# Patient Record
Sex: FEMALE | Race: WHITE | NOT HISPANIC OR LATINO | URBAN - METROPOLITAN AREA
[De-identification: names, ages, dates, MRNs, and addresses within clinical notes are randomized per-mention and may not be internally consistent; named-entity substitution may affect disease eponyms.]

---

## 2023-11-21 ENCOUNTER — INPATIENT (INPATIENT)
Facility: HOSPITAL | Age: 83
LOS: 7 days | Discharge: NURSING, REHAB OR LONG-TERM CARE | DRG: 377 | End: 2023-11-29
Attending: STUDENT IN AN ORGANIZED HEALTH CARE EDUCATION/TRAINING PROGRAM | Admitting: HOSPITALIST
Payer: MEDICARE

## 2023-11-21 VITALS
TEMPERATURE: 98 F | OXYGEN SATURATION: 99 % | RESPIRATION RATE: 18 BRPM | SYSTOLIC BLOOD PRESSURE: 122 MMHG | HEART RATE: 95 BPM | DIASTOLIC BLOOD PRESSURE: 53 MMHG

## 2023-11-21 LAB
BASOPHILS # BLD AUTO: 0.05 K/UL — SIGNIFICANT CHANGE UP (ref 0–0.2)
BASOPHILS # BLD AUTO: 0.05 K/UL — SIGNIFICANT CHANGE UP (ref 0–0.2)
BASOPHILS NFR BLD AUTO: 0.7 % — SIGNIFICANT CHANGE UP (ref 0–2)
BASOPHILS NFR BLD AUTO: 0.7 % — SIGNIFICANT CHANGE UP (ref 0–2)
EOSINOPHIL # BLD AUTO: 0.11 K/UL — SIGNIFICANT CHANGE UP (ref 0–0.5)
EOSINOPHIL # BLD AUTO: 0.11 K/UL — SIGNIFICANT CHANGE UP (ref 0–0.5)
EOSINOPHIL NFR BLD AUTO: 1.5 % — SIGNIFICANT CHANGE UP (ref 0–6)
EOSINOPHIL NFR BLD AUTO: 1.5 % — SIGNIFICANT CHANGE UP (ref 0–6)
HCT VFR BLD CALC: 23.1 % — LOW (ref 34.5–45)
HCT VFR BLD CALC: 23.1 % — LOW (ref 34.5–45)
HGB BLD-MCNC: 7.7 G/DL — LOW (ref 11.5–15.5)
HGB BLD-MCNC: 7.7 G/DL — LOW (ref 11.5–15.5)
IMM GRANULOCYTES NFR BLD AUTO: 0.4 % — SIGNIFICANT CHANGE UP (ref 0–0.9)
IMM GRANULOCYTES NFR BLD AUTO: 0.4 % — SIGNIFICANT CHANGE UP (ref 0–0.9)
LYMPHOCYTES # BLD AUTO: 1.13 K/UL — SIGNIFICANT CHANGE UP (ref 1–3.3)
LYMPHOCYTES # BLD AUTO: 1.13 K/UL — SIGNIFICANT CHANGE UP (ref 1–3.3)
LYMPHOCYTES # BLD AUTO: 15.6 % — SIGNIFICANT CHANGE UP (ref 13–44)
LYMPHOCYTES # BLD AUTO: 15.6 % — SIGNIFICANT CHANGE UP (ref 13–44)
MCHC RBC-ENTMCNC: 32.2 PG — SIGNIFICANT CHANGE UP (ref 27–34)
MCHC RBC-ENTMCNC: 32.2 PG — SIGNIFICANT CHANGE UP (ref 27–34)
MCHC RBC-ENTMCNC: 33.3 GM/DL — SIGNIFICANT CHANGE UP (ref 32–36)
MCHC RBC-ENTMCNC: 33.3 GM/DL — SIGNIFICANT CHANGE UP (ref 32–36)
MCV RBC AUTO: 96.7 FL — SIGNIFICANT CHANGE UP (ref 80–100)
MCV RBC AUTO: 96.7 FL — SIGNIFICANT CHANGE UP (ref 80–100)
MONOCYTES # BLD AUTO: 0.81 K/UL — SIGNIFICANT CHANGE UP (ref 0–0.9)
MONOCYTES # BLD AUTO: 0.81 K/UL — SIGNIFICANT CHANGE UP (ref 0–0.9)
MONOCYTES NFR BLD AUTO: 11.2 % — SIGNIFICANT CHANGE UP (ref 2–14)
MONOCYTES NFR BLD AUTO: 11.2 % — SIGNIFICANT CHANGE UP (ref 2–14)
NEUTROPHILS # BLD AUTO: 5.11 K/UL — SIGNIFICANT CHANGE UP (ref 1.8–7.4)
NEUTROPHILS # BLD AUTO: 5.11 K/UL — SIGNIFICANT CHANGE UP (ref 1.8–7.4)
NEUTROPHILS NFR BLD AUTO: 70.6 % — SIGNIFICANT CHANGE UP (ref 43–77)
NEUTROPHILS NFR BLD AUTO: 70.6 % — SIGNIFICANT CHANGE UP (ref 43–77)
PLATELET # BLD AUTO: 295 K/UL — SIGNIFICANT CHANGE UP (ref 150–400)
PLATELET # BLD AUTO: 295 K/UL — SIGNIFICANT CHANGE UP (ref 150–400)
RBC # BLD: 2.39 M/UL — LOW (ref 3.8–5.2)
RBC # BLD: 2.39 M/UL — LOW (ref 3.8–5.2)
RBC # FLD: 14.2 % — SIGNIFICANT CHANGE UP (ref 10.3–14.5)
RBC # FLD: 14.2 % — SIGNIFICANT CHANGE UP (ref 10.3–14.5)
WBC # BLD: 7.24 K/UL — SIGNIFICANT CHANGE UP (ref 3.8–10.5)
WBC # BLD: 7.24 K/UL — SIGNIFICANT CHANGE UP (ref 3.8–10.5)
WBC # FLD AUTO: 7.24 K/UL — SIGNIFICANT CHANGE UP (ref 3.8–10.5)
WBC # FLD AUTO: 7.24 K/UL — SIGNIFICANT CHANGE UP (ref 3.8–10.5)

## 2023-11-21 PROCEDURE — 99285 EMERGENCY DEPT VISIT HI MDM: CPT | Mod: GC

## 2023-11-21 NOTE — ED PROVIDER NOTE - NS ED ROS FT
General: Denies fever, chills  HEENT: Denies sore throat  Neck: Denies neck pain  Resp: Denies coughing, SOB  Cardiovascular: Denies CP, palpitations, LE edema  GI: Denies nausea, vomiting, abdominal pain, constipation. diarrhea, blood in stool.   : Denies dysuria, hematuria  MSK: Denies back pain  Neuro: Denies HA, dizziness, numbness, weakness  Skin: Denies rashes.

## 2023-11-21 NOTE — ED ADULT TRIAGE NOTE - BP NONINVASIVE DIASTOLIC (MM HG)
Progress Notes by Chhaya Perez CPT at 10/02/17 03:51 PM     Author:  Chhaya Perez CPT Service:  (none) Author Type:  Technician     Filed:  10/02/17 03:51 PM Encounter Date:  9/27/2017 Status:  Signed     :  Chhaya Perez CPT (Technician)            See new order[AC1.1M]      Revision History        User Key Date/Time User Provider Type Action    > AC1.1 10/02/17 03:51 PM Chhaya Perez CPT Technician Sign    M - Manual
Progress Notes by Trang Diamond CPT at 09/27/17 03:26 PM     Author:  Trang Diamond CPT Service:  (none) Author Type:  Technician     Filed:  10/02/17 03:51 PM Encounter Date:  9/27/2017 Status:  Signed     :  Trang Diamond CPT (Technician)            PLAN:[AC1.1T]  Dispensed to patient Bilateral  More lenses on order to hold over Left  Pulled trial lenses from stock Right    Patient can     [facility] online #[id number][AC1.1M]        Revision History        User Key Date/Time User Provider Type Action    > AC1.1 10/02/17 03:51 PM Trang Diamond CPT Technician Sign    M - Manual, T - Template
53

## 2023-11-21 NOTE — ED PROVIDER NOTE - OBJECTIVE STATEMENT
83y female w/ pmh of dementia and CVA presenting with her daughter with complaints of diarrhea and rectal bleeding. She states she has had consistent diarrhea for about 2 months and has had bright red blood per rectum for the past 2 weeks. she is now feeling fatigued but denies fever, CP, SOB, lightheadedness, LOC, N/V. She gets intermittent RLQ pain but is currently pain free. Denies any rectal pain or vaginal bleeding. She was started on warfarin after her hospital admission for CVA about 3 months ago, patients family is not sure why. No history of surgery to the abdomen. Her medical records are at [facility].

## 2023-11-21 NOTE — ED PROVIDER NOTE - PHYSICAL EXAMINATION
General: Awake, alert, lying in bed in NAD  HEENT: Normocephalic, atraumatic. No scleral icterus or conjunctival injection. EOMI. Moist mucous membranes. Oropharynx clear.   Neck:. Soft and supple.  Cardiac: RRR, Peripheral pulses 2+ and symmetric. No LE edema.  Resp: Lungs CTAB. No accessory muscle use  Abd: Soft, non-tender, non-distended. No guarding, rebound, or rigidity.  Back: Spine midline and non-tender.   Skin: No rashes, abrasions, or lacerations.  Neuro: AO x 3. Moves all extremities symmetrically. Motor strength and sensation grossly intact.  Psych: Appropriate mood and affect General: Awake, alert, lying in bed in NAD  HEENT: Normocephalic, atraumatic. No scleral icterus or conjunctival injection. EOMI. Moist mucous membranes. Oropharynx clear.   Neck:. Soft and supple.  Cardiac: RRR, Peripheral pulses 2+ and symmetric. No LE edema.  Resp: Lungs CTAB. No accessory muscle use  Abd: Soft, non-tender, non-distended. No guarding, rebound, or rigidity.  Rectal: bright red blood noted around the rectum, external hemorrhoids appreciated. no active bleeding or fissures. brown stool.   Back: Spine midline and non-tender.   Skin: No rashes, abrasions, or lacerations.  Neuro: AO x 3. Moves all extremities symmetrically. Motor strength and sensation grossly intact.  Psych: Appropriate mood and affect

## 2023-11-21 NOTE — ED PROVIDER NOTE - ATTENDING CONTRIBUTION TO CARE
83y female w/ pmh of dementia and CVA presenting with her daughter with complaints of diarrhea and rectal bleeding.   I personally saw the patient with the resident, and completed the key components of the history and physical exam. I then discussed the management plan with the resident.

## 2023-11-21 NOTE — ED ADULT NURSE NOTE - OBJECTIVE STATEMENT
a&ox3, presents to ed with complaints of rectal bleeding for the past week and diarrhea for past three days. Patient says " anytime I go its bright red blood and diarrhea". Patient denies gi hx. Denies nausea, vomiting. Denies chest pain. Denies abdominal pain. Granddaughter at bedside reports patient was on antibiotics for weeks for uti.

## 2023-11-21 NOTE — ED PROVIDER NOTE - CARE PLAN
1 Principal Discharge DX:	GI bleed  Secondary Diagnosis:	Anemia   Principal Discharge DX:	GI bleed  Secondary Diagnosis:	Anemia  Secondary Diagnosis:	Elevated INR   Principal Discharge DX:	GI bleed  Secondary Diagnosis:	Anemia  Secondary Diagnosis:	Supratherapeutic INR

## 2023-11-21 NOTE — ED PROVIDER NOTE - CLINICAL SUMMARY MEDICAL DECISION MAKING FREE TEXT BOX
83y female w/ pmh of dementia and CVA evaluated for diarrhea and rectal bleeding. Patient well appearing on exam, vitals stable, abdomen nontender, endorses fatigue. 83y female w/ pmh of dementia and CVA evaluated for diarrhea and rectal bleeding. Has been taking warfarin for the past 2 months, patient and family are unclear why. Patient well appearing on exam, endorses fatigue, vitals stable, abdomen nontender, rectal exam notable for red blood but no active bleeding. Workup significant for hemoglobin of 7.7 83y female w/ pmh of dementia and CVA evaluated for diarrhea and rectal bleeding. Has been taking warfarin for the past 2 months, patient and family are unclear why. Patient well appearing on exam, endorses fatigue, vitals stable, abdomen nontender, rectal exam notable for red blood but no active bleeding. Workup significant for hemoglobin of 7.7, INR >15, PT >200, and potassium of 3.3. Warfarin reversal initiated in ED. Patient to be admitted for further treatment and GI consult.

## 2023-11-21 NOTE — ED ADULT TRIAGE NOTE - CHIEF COMPLAINT QUOTE
Pt. endorsing rectal bleeding x multiple days, worse today. Denies dizziness/lightheadedness/SOB/CP. Denies AC use.

## 2023-11-22 DIAGNOSIS — K92.2 GASTROINTESTINAL HEMORRHAGE, UNSPECIFIED: ICD-10-CM

## 2023-11-22 LAB
ABO RH CONFIRMATION: SIGNIFICANT CHANGE UP
ABO RH CONFIRMATION: SIGNIFICANT CHANGE UP
ALBUMIN SERPL ELPH-MCNC: 3.1 G/DL — LOW (ref 3.3–5.2)
ALBUMIN SERPL ELPH-MCNC: 3.1 G/DL — LOW (ref 3.3–5.2)
ALP SERPL-CCNC: 61 U/L — SIGNIFICANT CHANGE UP (ref 40–120)
ALP SERPL-CCNC: 61 U/L — SIGNIFICANT CHANGE UP (ref 40–120)
ALT FLD-CCNC: 13 U/L — SIGNIFICANT CHANGE UP
ALT FLD-CCNC: 13 U/L — SIGNIFICANT CHANGE UP
ANION GAP SERPL CALC-SCNC: 11 MMOL/L — SIGNIFICANT CHANGE UP (ref 5–17)
ANION GAP SERPL CALC-SCNC: 14 MMOL/L — SIGNIFICANT CHANGE UP (ref 5–17)
ANION GAP SERPL CALC-SCNC: 14 MMOL/L — SIGNIFICANT CHANGE UP (ref 5–17)
APTT BLD: 86 SEC — HIGH (ref 24.5–35.6)
APTT BLD: 86 SEC — HIGH (ref 24.5–35.6)
AST SERPL-CCNC: 16 U/L — SIGNIFICANT CHANGE UP
AST SERPL-CCNC: 16 U/L — SIGNIFICANT CHANGE UP
BASOPHILS # BLD AUTO: 0.05 K/UL — SIGNIFICANT CHANGE UP (ref 0–0.2)
BASOPHILS # BLD AUTO: 0.05 K/UL — SIGNIFICANT CHANGE UP (ref 0–0.2)
BASOPHILS NFR BLD AUTO: 0.7 % — SIGNIFICANT CHANGE UP (ref 0–2)
BASOPHILS NFR BLD AUTO: 0.7 % — SIGNIFICANT CHANGE UP (ref 0–2)
BILIRUB SERPL-MCNC: <0.2 MG/DL — LOW (ref 0.4–2)
BILIRUB SERPL-MCNC: <0.2 MG/DL — LOW (ref 0.4–2)
BLD GP AB SCN SERPL QL: SIGNIFICANT CHANGE UP
BLD GP AB SCN SERPL QL: SIGNIFICANT CHANGE UP
BUN SERPL-MCNC: 19.5 MG/DL — SIGNIFICANT CHANGE UP (ref 8–20)
BUN SERPL-MCNC: 19.5 MG/DL — SIGNIFICANT CHANGE UP (ref 8–20)
BUN SERPL-MCNC: 28 MG/DL — HIGH (ref 8–20)
BUN SERPL-MCNC: 28 MG/DL — HIGH (ref 8–20)
BUN SERPL-MCNC: 32.9 MG/DL — HIGH (ref 8–20)
BUN SERPL-MCNC: 32.9 MG/DL — HIGH (ref 8–20)
C DIFF BY PCR RESULT: SIGNIFICANT CHANGE UP
C DIFF BY PCR RESULT: SIGNIFICANT CHANGE UP
CALCIUM SERPL-MCNC: 7.7 MG/DL — LOW (ref 8.4–10.5)
CALCIUM SERPL-MCNC: 8 MG/DL — LOW (ref 8.4–10.5)
CALCIUM SERPL-MCNC: 8 MG/DL — LOW (ref 8.4–10.5)
CHLORIDE SERPL-SCNC: 106 MMOL/L — SIGNIFICANT CHANGE UP (ref 96–108)
CHLORIDE SERPL-SCNC: 106 MMOL/L — SIGNIFICANT CHANGE UP (ref 96–108)
CHLORIDE SERPL-SCNC: 107 MMOL/L — SIGNIFICANT CHANGE UP (ref 96–108)
CHLORIDE SERPL-SCNC: 107 MMOL/L — SIGNIFICANT CHANGE UP (ref 96–108)
CHLORIDE SERPL-SCNC: 110 MMOL/L — HIGH (ref 96–108)
CHLORIDE SERPL-SCNC: 110 MMOL/L — HIGH (ref 96–108)
CO2 SERPL-SCNC: 19 MMOL/L — LOW (ref 22–29)
CO2 SERPL-SCNC: 19 MMOL/L — LOW (ref 22–29)
CO2 SERPL-SCNC: 20 MMOL/L — LOW (ref 22–29)
CREAT SERPL-MCNC: 0.7 MG/DL — SIGNIFICANT CHANGE UP (ref 0.5–1.3)
CREAT SERPL-MCNC: 0.7 MG/DL — SIGNIFICANT CHANGE UP (ref 0.5–1.3)
CREAT SERPL-MCNC: 0.78 MG/DL — SIGNIFICANT CHANGE UP (ref 0.5–1.3)
CREAT SERPL-MCNC: 0.78 MG/DL — SIGNIFICANT CHANGE UP (ref 0.5–1.3)
CREAT SERPL-MCNC: 0.88 MG/DL — SIGNIFICANT CHANGE UP (ref 0.5–1.3)
CREAT SERPL-MCNC: 0.88 MG/DL — SIGNIFICANT CHANGE UP (ref 0.5–1.3)
EGFR: 65 ML/MIN/1.73M2 — SIGNIFICANT CHANGE UP
EGFR: 65 ML/MIN/1.73M2 — SIGNIFICANT CHANGE UP
EGFR: 75 ML/MIN/1.73M2 — SIGNIFICANT CHANGE UP
EGFR: 75 ML/MIN/1.73M2 — SIGNIFICANT CHANGE UP
EGFR: 86 ML/MIN/1.73M2 — SIGNIFICANT CHANGE UP
EGFR: 86 ML/MIN/1.73M2 — SIGNIFICANT CHANGE UP
EOSINOPHIL # BLD AUTO: 0.14 K/UL — SIGNIFICANT CHANGE UP (ref 0–0.5)
EOSINOPHIL # BLD AUTO: 0.14 K/UL — SIGNIFICANT CHANGE UP (ref 0–0.5)
EOSINOPHIL NFR BLD AUTO: 1.9 % — SIGNIFICANT CHANGE UP (ref 0–6)
EOSINOPHIL NFR BLD AUTO: 1.9 % — SIGNIFICANT CHANGE UP (ref 0–6)
FERRITIN SERPL-MCNC: 94 NG/ML — SIGNIFICANT CHANGE UP (ref 13–330)
FERRITIN SERPL-MCNC: 94 NG/ML — SIGNIFICANT CHANGE UP (ref 13–330)
FOLATE SERPL-MCNC: 13.8 NG/ML — SIGNIFICANT CHANGE UP
FOLATE SERPL-MCNC: 13.8 NG/ML — SIGNIFICANT CHANGE UP
GI PCR PANEL: SIGNIFICANT CHANGE UP
GI PCR PANEL: SIGNIFICANT CHANGE UP
GLUCOSE SERPL-MCNC: 103 MG/DL — HIGH (ref 70–99)
GLUCOSE SERPL-MCNC: 114 MG/DL — HIGH (ref 70–99)
GLUCOSE SERPL-MCNC: 114 MG/DL — HIGH (ref 70–99)
HCT VFR BLD CALC: 24 % — LOW (ref 34.5–45)
HCT VFR BLD CALC: 24 % — LOW (ref 34.5–45)
HGB BLD-MCNC: 7.9 G/DL — LOW (ref 11.5–15.5)
HGB BLD-MCNC: 7.9 G/DL — LOW (ref 11.5–15.5)
IMM GRANULOCYTES NFR BLD AUTO: 0.4 % — SIGNIFICANT CHANGE UP (ref 0–0.9)
IMM GRANULOCYTES NFR BLD AUTO: 0.4 % — SIGNIFICANT CHANGE UP (ref 0–0.9)
INR BLD: 1.67 RATIO — HIGH (ref 0.85–1.18)
INR BLD: 1.67 RATIO — HIGH (ref 0.85–1.18)
INR BLD: >15 RATIO — CRITICAL HIGH (ref 0.85–1.18)
INR BLD: >15 RATIO — CRITICAL HIGH (ref 0.85–1.18)
IRON SATN MFR SERPL: 15 % — SIGNIFICANT CHANGE UP (ref 14–50)
IRON SATN MFR SERPL: 15 % — SIGNIFICANT CHANGE UP (ref 14–50)
IRON SATN MFR SERPL: 23 % — SIGNIFICANT CHANGE UP (ref 14–50)
IRON SATN MFR SERPL: 23 % — SIGNIFICANT CHANGE UP (ref 14–50)
IRON SATN MFR SERPL: 34 UG/DL — LOW (ref 37–145)
IRON SATN MFR SERPL: 34 UG/DL — LOW (ref 37–145)
IRON SATN MFR SERPL: 48 UG/DL — SIGNIFICANT CHANGE UP (ref 37–145)
IRON SATN MFR SERPL: 48 UG/DL — SIGNIFICANT CHANGE UP (ref 37–145)
LYMPHOCYTES # BLD AUTO: 1.18 K/UL — SIGNIFICANT CHANGE UP (ref 1–3.3)
LYMPHOCYTES # BLD AUTO: 1.18 K/UL — SIGNIFICANT CHANGE UP (ref 1–3.3)
LYMPHOCYTES # BLD AUTO: 15.9 % — SIGNIFICANT CHANGE UP (ref 13–44)
LYMPHOCYTES # BLD AUTO: 15.9 % — SIGNIFICANT CHANGE UP (ref 13–44)
MAGNESIUM SERPL-MCNC: 1 MG/DL — CRITICAL LOW (ref 1.6–2.6)
MAGNESIUM SERPL-MCNC: 1 MG/DL — CRITICAL LOW (ref 1.6–2.6)
MCHC RBC-ENTMCNC: 32.9 GM/DL — SIGNIFICANT CHANGE UP (ref 32–36)
MCHC RBC-ENTMCNC: 32.9 GM/DL — SIGNIFICANT CHANGE UP (ref 32–36)
MCHC RBC-ENTMCNC: 32.9 PG — SIGNIFICANT CHANGE UP (ref 27–34)
MCHC RBC-ENTMCNC: 32.9 PG — SIGNIFICANT CHANGE UP (ref 27–34)
MCV RBC AUTO: 100 FL — SIGNIFICANT CHANGE UP (ref 80–100)
MCV RBC AUTO: 100 FL — SIGNIFICANT CHANGE UP (ref 80–100)
MONOCYTES # BLD AUTO: 0.72 K/UL — SIGNIFICANT CHANGE UP (ref 0–0.9)
MONOCYTES # BLD AUTO: 0.72 K/UL — SIGNIFICANT CHANGE UP (ref 0–0.9)
MONOCYTES NFR BLD AUTO: 9.7 % — SIGNIFICANT CHANGE UP (ref 2–14)
MONOCYTES NFR BLD AUTO: 9.7 % — SIGNIFICANT CHANGE UP (ref 2–14)
NEUTROPHILS # BLD AUTO: 5.29 K/UL — SIGNIFICANT CHANGE UP (ref 1.8–7.4)
NEUTROPHILS # BLD AUTO: 5.29 K/UL — SIGNIFICANT CHANGE UP (ref 1.8–7.4)
NEUTROPHILS NFR BLD AUTO: 71.4 % — SIGNIFICANT CHANGE UP (ref 43–77)
NEUTROPHILS NFR BLD AUTO: 71.4 % — SIGNIFICANT CHANGE UP (ref 43–77)
PLATELET # BLD AUTO: 233 K/UL — SIGNIFICANT CHANGE UP (ref 150–400)
PLATELET # BLD AUTO: 233 K/UL — SIGNIFICANT CHANGE UP (ref 150–400)
POTASSIUM SERPL-MCNC: 3.3 MMOL/L — LOW (ref 3.5–5.3)
POTASSIUM SERPL-MCNC: 3.3 MMOL/L — LOW (ref 3.5–5.3)
POTASSIUM SERPL-MCNC: 3.8 MMOL/L — SIGNIFICANT CHANGE UP (ref 3.5–5.3)
POTASSIUM SERPL-MCNC: 3.8 MMOL/L — SIGNIFICANT CHANGE UP (ref 3.5–5.3)
POTASSIUM SERPL-MCNC: 4.2 MMOL/L — SIGNIFICANT CHANGE UP (ref 3.5–5.3)
POTASSIUM SERPL-MCNC: 4.2 MMOL/L — SIGNIFICANT CHANGE UP (ref 3.5–5.3)
POTASSIUM SERPL-SCNC: 3.3 MMOL/L — LOW (ref 3.5–5.3)
POTASSIUM SERPL-SCNC: 3.3 MMOL/L — LOW (ref 3.5–5.3)
POTASSIUM SERPL-SCNC: 3.8 MMOL/L — SIGNIFICANT CHANGE UP (ref 3.5–5.3)
POTASSIUM SERPL-SCNC: 3.8 MMOL/L — SIGNIFICANT CHANGE UP (ref 3.5–5.3)
POTASSIUM SERPL-SCNC: 4.2 MMOL/L — SIGNIFICANT CHANGE UP (ref 3.5–5.3)
POTASSIUM SERPL-SCNC: 4.2 MMOL/L — SIGNIFICANT CHANGE UP (ref 3.5–5.3)
PROT SERPL-MCNC: 5.5 G/DL — LOW (ref 6.6–8.7)
PROT SERPL-MCNC: 5.5 G/DL — LOW (ref 6.6–8.7)
PROTHROM AB SERPL-ACNC: 18.3 SEC — HIGH (ref 9.5–13)
PROTHROM AB SERPL-ACNC: 18.3 SEC — HIGH (ref 9.5–13)
PROTHROM AB SERPL-ACNC: >200 SEC — HIGH (ref 9.5–13)
PROTHROM AB SERPL-ACNC: >200 SEC — HIGH (ref 9.5–13)
RBC # BLD: 2.4 M/UL — LOW (ref 3.8–5.2)
RBC # BLD: 2.4 M/UL — LOW (ref 3.8–5.2)
RBC # FLD: 14.2 % — SIGNIFICANT CHANGE UP (ref 10.3–14.5)
RBC # FLD: 14.2 % — SIGNIFICANT CHANGE UP (ref 10.3–14.5)
SODIUM SERPL-SCNC: 137 MMOL/L — SIGNIFICANT CHANGE UP (ref 135–145)
SODIUM SERPL-SCNC: 137 MMOL/L — SIGNIFICANT CHANGE UP (ref 135–145)
SODIUM SERPL-SCNC: 138 MMOL/L — SIGNIFICANT CHANGE UP (ref 135–145)
SODIUM SERPL-SCNC: 138 MMOL/L — SIGNIFICANT CHANGE UP (ref 135–145)
SODIUM SERPL-SCNC: 143 MMOL/L — SIGNIFICANT CHANGE UP (ref 135–145)
SODIUM SERPL-SCNC: 143 MMOL/L — SIGNIFICANT CHANGE UP (ref 135–145)
TIBC SERPL-MCNC: 212 UG/DL — LOW (ref 220–430)
TIBC SERPL-MCNC: 212 UG/DL — LOW (ref 220–430)
TIBC SERPL-MCNC: 229 UG/DL — SIGNIFICANT CHANGE UP (ref 220–430)
TIBC SERPL-MCNC: 229 UG/DL — SIGNIFICANT CHANGE UP (ref 220–430)
TRANSFERRIN SERPL-MCNC: 148 MG/DL — LOW (ref 192–382)
TRANSFERRIN SERPL-MCNC: 148 MG/DL — LOW (ref 192–382)
TRANSFERRIN SERPL-MCNC: 160 MG/DL — LOW (ref 192–382)
TRANSFERRIN SERPL-MCNC: 160 MG/DL — LOW (ref 192–382)
TSH SERPL-MCNC: 1.06 UIU/ML — SIGNIFICANT CHANGE UP (ref 0.27–4.2)
TSH SERPL-MCNC: 1.06 UIU/ML — SIGNIFICANT CHANGE UP (ref 0.27–4.2)
VIT B12 SERPL-MCNC: 224 PG/ML — LOW (ref 232–1245)
VIT B12 SERPL-MCNC: 224 PG/ML — LOW (ref 232–1245)
VIT B12 SERPL-MCNC: 245 PG/ML — SIGNIFICANT CHANGE UP (ref 232–1245)
VIT B12 SERPL-MCNC: 245 PG/ML — SIGNIFICANT CHANGE UP (ref 232–1245)
WBC # BLD: 7.41 K/UL — SIGNIFICANT CHANGE UP (ref 3.8–10.5)
WBC # BLD: 7.41 K/UL — SIGNIFICANT CHANGE UP (ref 3.8–10.5)
WBC # FLD AUTO: 7.41 K/UL — SIGNIFICANT CHANGE UP (ref 3.8–10.5)
WBC # FLD AUTO: 7.41 K/UL — SIGNIFICANT CHANGE UP (ref 3.8–10.5)

## 2023-11-22 PROCEDURE — 93010 ELECTROCARDIOGRAM REPORT: CPT

## 2023-11-22 PROCEDURE — 99223 1ST HOSP IP/OBS HIGH 75: CPT

## 2023-11-22 RX ORDER — ESCITALOPRAM OXALATE 10 MG/1
10 TABLET, FILM COATED ORAL DAILY
Refills: 0 | Status: DISCONTINUED | OUTPATIENT
Start: 2023-11-22 | End: 2023-11-29

## 2023-11-22 RX ORDER — TRAZODONE HCL 50 MG
50 TABLET ORAL DAILY
Refills: 0 | Status: DISCONTINUED | OUTPATIENT
Start: 2023-11-22 | End: 2023-11-25

## 2023-11-22 RX ORDER — LANOLIN ALCOHOL/MO/W.PET/CERES
3 CREAM (GRAM) TOPICAL ONCE
Refills: 0 | Status: COMPLETED | OUTPATIENT
Start: 2023-11-22 | End: 2023-11-22

## 2023-11-22 RX ORDER — LANOLIN ALCOHOL/MO/W.PET/CERES
3 CREAM (GRAM) TOPICAL AT BEDTIME
Refills: 0 | Status: DISCONTINUED | OUTPATIENT
Start: 2023-11-22 | End: 2023-11-29

## 2023-11-22 RX ORDER — SODIUM CHLORIDE 9 MG/ML
1000 INJECTION INTRAMUSCULAR; INTRAVENOUS; SUBCUTANEOUS
Refills: 0 | Status: DISCONTINUED | OUTPATIENT
Start: 2023-11-22 | End: 2023-11-22

## 2023-11-22 RX ORDER — PREGABALIN 225 MG/1
1000 CAPSULE ORAL DAILY
Refills: 0 | Status: DISCONTINUED | OUTPATIENT
Start: 2023-11-24 | End: 2023-11-29

## 2023-11-22 RX ORDER — MAGNESIUM SULFATE 500 MG/ML
2 VIAL (ML) INJECTION ONCE
Refills: 0 | Status: COMPLETED | OUTPATIENT
Start: 2023-11-22 | End: 2023-11-22

## 2023-11-22 RX ORDER — PREGABALIN 225 MG/1
1000 CAPSULE ORAL DAILY
Refills: 0 | Status: DISCONTINUED | OUTPATIENT
Start: 2023-11-22 | End: 2023-11-24

## 2023-11-22 RX ORDER — PHYTONADIONE (VIT K1) 5 MG
5 TABLET ORAL ONCE
Refills: 0 | Status: COMPLETED | OUTPATIENT
Start: 2023-11-22 | End: 2023-11-22

## 2023-11-22 RX ORDER — MAGNESIUM OXIDE 400 MG ORAL TABLET 241.3 MG
400 TABLET ORAL
Refills: 0 | Status: COMPLETED | OUTPATIENT
Start: 2023-11-22 | End: 2023-11-24

## 2023-11-22 RX ORDER — AMLODIPINE BESYLATE 2.5 MG/1
5 TABLET ORAL DAILY
Refills: 0 | Status: DISCONTINUED | OUTPATIENT
Start: 2023-11-22 | End: 2023-11-22

## 2023-11-22 RX ORDER — FAMOTIDINE 10 MG/ML
1 INJECTION INTRAVENOUS
Refills: 0 | DISCHARGE

## 2023-11-22 RX ORDER — SODIUM CHLORIDE 9 MG/ML
1000 INJECTION, SOLUTION INTRAVENOUS
Refills: 0 | Status: DISCONTINUED | OUTPATIENT
Start: 2023-11-22 | End: 2023-11-24

## 2023-11-22 RX ORDER — POTASSIUM CHLORIDE 20 MEQ
40 PACKET (EA) ORAL ONCE
Refills: 0 | Status: COMPLETED | OUTPATIENT
Start: 2023-11-22 | End: 2023-11-22

## 2023-11-22 RX ORDER — ACETAMINOPHEN 500 MG
650 TABLET ORAL EVERY 6 HOURS
Refills: 0 | Status: DISCONTINUED | OUTPATIENT
Start: 2023-11-22 | End: 2023-11-29

## 2023-11-22 RX ORDER — PROTHROMBIN COMPLEX CONCENTRATE (HUMAN) 25.5; 16.5; 24; 22; 22; 26 [IU]/ML; [IU]/ML; [IU]/ML; [IU]/ML; [IU]/ML; [IU]/ML
1500 POWDER, FOR SOLUTION INTRAVENOUS ONCE
Refills: 0 | Status: COMPLETED | OUTPATIENT
Start: 2023-11-22 | End: 2023-11-22

## 2023-11-22 RX ORDER — MAGNESIUM SULFATE 500 MG/ML
4 VIAL (ML) INJECTION ONCE
Refills: 0 | Status: DISCONTINUED | OUTPATIENT
Start: 2023-11-22 | End: 2023-11-22

## 2023-11-22 RX ORDER — PANTOPRAZOLE SODIUM 20 MG/1
40 TABLET, DELAYED RELEASE ORAL DAILY
Refills: 0 | Status: DISCONTINUED | OUTPATIENT
Start: 2023-11-22 | End: 2023-11-29

## 2023-11-22 RX ORDER — ONDANSETRON 8 MG/1
4 TABLET, FILM COATED ORAL EVERY 8 HOURS
Refills: 0 | Status: DISCONTINUED | OUTPATIENT
Start: 2023-11-22 | End: 2023-11-29

## 2023-11-22 RX ADMIN — PROTHROMBIN COMPLEX CONCENTRATE (HUMAN) 400 INTERNATIONAL UNIT(S): 25.5; 16.5; 24; 22; 22; 26 POWDER, FOR SOLUTION INTRAVENOUS at 02:48

## 2023-11-22 RX ADMIN — Medication 650 MILLIGRAM(S): at 13:24

## 2023-11-22 RX ADMIN — MAGNESIUM OXIDE 400 MG ORAL TABLET 400 MILLIGRAM(S): 241.3 TABLET ORAL at 18:37

## 2023-11-22 RX ADMIN — PANTOPRAZOLE SODIUM 40 MILLIGRAM(S): 20 TABLET, DELAYED RELEASE ORAL at 05:42

## 2023-11-22 RX ADMIN — Medication 3 MILLIGRAM(S): at 22:45

## 2023-11-22 RX ADMIN — Medication 25 GRAM(S): at 18:37

## 2023-11-22 RX ADMIN — Medication 50 MILLIGRAM(S): at 12:54

## 2023-11-22 RX ADMIN — Medication 3 MILLIGRAM(S): at 21:09

## 2023-11-22 RX ADMIN — Medication 25 GRAM(S): at 13:03

## 2023-11-22 RX ADMIN — Medication 40 MILLIEQUIVALENT(S): at 03:51

## 2023-11-22 RX ADMIN — Medication 650 MILLIGRAM(S): at 12:54

## 2023-11-22 RX ADMIN — Medication 5 MILLIGRAM(S): at 03:51

## 2023-11-22 RX ADMIN — SODIUM CHLORIDE 50 MILLILITER(S): 9 INJECTION, SOLUTION INTRAVENOUS at 18:38

## 2023-11-22 RX ADMIN — ESCITALOPRAM OXALATE 10 MILLIGRAM(S): 10 TABLET, FILM COATED ORAL at 12:54

## 2023-11-22 NOTE — CONSULT NOTE ADULT - ASSESSMENT
83 year old female with a history of CVA on coumadin who presents with diarrhea rectal bleeding in the setting of supratherapeutic INR     Rectal Bleeding  Hemorrhoids  Supratherapuetic INR  Diarrhea   Correct INR  Send c diff, GI PCR, Stool culture, ova and parasites, fecal calprotectin and fecal elastase, check TSH and celiac serologies to evaluate chronic diarrhea  Advance to clear liquid diet  Please obtain [facility] records   Correct lytes   PPI daily   No plan for endoscopic intervention at this time

## 2023-11-22 NOTE — H&P ADULT - HISTORY OF PRESENT ILLNESS
This is a 83 year old female w/ PMHx of dementia, AAOX1 - self, CVA S/P MOHAN X 3 months, presents for evaluation of rectal bleeding and diarrhea.  Patient is unable to provide any history and family is not available.  As per EMR, diarrhea X 2 months, and 2 weeks of BRBPR.  She was started on warfarin for unclear reasons 3 months prior while admitted for CVA at [facility], however has not had any follow up bloodwork since.  +lethargy.  +intermittent RLQ pain.  As pe ED signout stool is brown with signs of blood around vault and noted hemorrhoids.  As per nursing no BM while in ER, and no blood noted as well.

## 2023-11-22 NOTE — CONSULT NOTE ADULT - SUBJECTIVE AND OBJECTIVE BOX
HISTORY OF PRESENT ILLNESS: This is a 83y old woman with a past medical history significant for CVA on coumadin, Hemorrhoids and dementia, the chart notes the patient has dementia and is A+ Ox 1 but she was able to provide a history and report several months of diarrhea with intermittent rectal bleeding due to hemorrhoids. She reports brown stool mixed with blood. She denies associated abdominal pain but states that her abdomen feels off. She reports weight loss during this time. She denies nausea or vomiting. She cannot recall her last colonoscopy. She states she was evaluated at [facility] for the same complaint and stool testing was done but no colonoscopy.       INR >15 on admission   HGB 7.7    ED rectal exam with hemorrhoids    PAST MEDICAL/SURGICAL HISTORY:  Dementia    CVA (cerebrovascular accident)      SOCIAL HISTORY:  - TOBACCO: Denies  - ALCOHOL: Denies  - ILLICIT DRUG USE: Denies    FAMILY HISTORY:  No known history of gastrointestinal or liver disease;      HOME MEDICATIONS:  amLODIPine 5 mg oral tablet: 1 tab(s) orally once a day (22 Nov 2023 03:06)  diphenoxylate-atropine 2.5 mg-0.025 mg oral tablet: 2 tab(s) orally 4 times a day (22 Nov 2023 03:10)  famotidine 10 mg oral tablet: 1 tab(s) orally once a day (22 Nov 2023 03:07)  Lexapro 10 mg oral tablet: 1 tab(s) orally once a day (22 Nov 2023 03:06)  Potassium Chloride (Eqv-Klor-Con 10) 10 mEq oral tablet, extended release: 1 tab(s) orally once a day (22 Nov 2023 03:08)  traZODone 50 mg oral tablet: orally once a day (22 Nov 2023 03:08)  warfarin 5 mg oral tablet: 1 tab(s) orally once a day (22 Nov 2023 03:09)    INPATIENT MEDICATIONS:  MEDICATIONS  (STANDING):  amLODIPine   Tablet 5 milliGRAM(s) Oral daily  escitalopram 10 milliGRAM(s) Oral daily  lactated ringers. 1000 milliLiter(s) (50 mL/Hr) IV Continuous <Continuous>  pantoprazole  Injectable 40 milliGRAM(s) IV Push daily  traZODone 50 milliGRAM(s) Oral daily    MEDICATIONS  (PRN):  acetaminophen     Tablet .. 650 milliGRAM(s) Oral every 6 hours PRN Temp greater or equal to 38C (100.4F), Mild Pain (1 - 3)  aluminum hydroxide/magnesium hydroxide/simethicone Suspension 30 milliLiter(s) Oral every 4 hours PRN Dyspepsia  melatonin 3 milliGRAM(s) Oral at bedtime PRN Insomnia  ondansetron Injectable 4 milliGRAM(s) IV Push every 8 hours PRN Nausea and/or Vomiting    ALLERGIES:  No Known Allergies    T(C): 36.7 (11-22-23 @ 06:45), Max: 36.9 (11-22-23 @ 00:12)  HR: 76 (11-22-23 @ 06:45) (76 - 95)  BP: 106/54 (11-22-23 @ 06:45) (106/54 - 139/83)  RR: 16 (11-22-23 @ 06:45) (16 - 20)  SpO2: 96% (11-22-23 @ 06:45) (96% - 99%)      PHYSICAL EXAM:  Constitutional: Well-developed, well-nourished, in no apparent distress  Eyes: Sclerae anicteric, conjunctivae normal  ENMT: Mucus membranes moist, no oropharyngeal thrush noted  Neck: No thyroid nodules appreciated, no significant cervical or supraclavicular lymphadenopathy  Respiratory: Breathing nonlabored; clear to auscultation  Cardiovascular: Regular rate and rhythm  Gastrointestinal: Soft, nontender, nondistended, normoactive bowel sounds; no hepatosplenomegaly appreciated; no rebound tenderness or involuntary guarding  Rectal: Perianal exam within normal limits; normal sphincter tone; brown stool on glove  Extremities: No clubbing, cyanosis or edema  Neurological: Alert and oriented to person, place and time; no asterixis  Skin: No jaundice  Lymph Nodes: No significant lymphadenopathy  Musculoskeletal: No significant peripheral atrophy  Psychiatric: Affect and mood appropriate      LABS:             7.9    7.41  )-----------( 233      ( 11-22 @ 06:45 )             24.0                7.7    7.24  )-----------( 295      ( 11-21 @ 23:15 )             23.1       PT/INR - ( 21 Nov 2023 23:43 )   PT: >200.0 sec;   INR: >15.00 ratio         PTT - ( 21 Nov 2023 23:43 )  PTT:86.0 sec  11-21    137  |  106  |  32.9<H>  ----------------------------<  103<H>  3.3<L>   |  20.0<L>  |  0.88    Ca    7.7<L>      21 Nov 2023 23:15    TPro  5.5<L>  /  Alb  3.1<L>  /  TBili  <0.2<L>  /  DBili  x   /  AST  16  /  ALT  13  /  AlkPhos  61  11-21    LIVER FUNCTIONS - ( 21 Nov 2023 23:15 )  Alb: 3.1 g/dL / Pro: 5.5 g/dL / ALK PHOS: 61 U/L / ALT: 13 U/L / AST: 16 U/L / GGT: x                   Urinalysis Basic - ( 21 Nov 2023 23:15 )    Color: x / Appearance: x / SG: x / pH: x  Gluc: 103 mg/dL / Ketone: x  / Bili: x / Urobili: x   Blood: x / Protein: x / Nitrite: x   Leuk Esterase: x / RBC: x / WBC x   Sq Epi: x / Non Sq Epi: x / Bacteria: x        IMAGING: I personally reviewed the XXXX, and I agree with the radiologist's interpretation as described below:

## 2023-11-22 NOTE — H&P ADULT - NSHPPHYSICALEXAM_GEN_ALL_CORE
PHYSICAL EXAM:  Vital Signs:  Vital Signs (24 Hrs):  T(C): 36.9 (11-22-23 @ 00:12), Max: 36.9 (11-22-23 @ 00:12)  HR: 86 (11-22-23 @ 00:12) (86 - 95)  BP: 139/83 (11-22-23 @ 00:12) (122/53 - 139/83)  RR: 20 (11-22-23 @ 00:12) (18 - 20)  SpO2: 98% (11-22-23 @ 00:12) (98% - 99%)    General: well appearing and in no acute distress, thin  HENT: atraumatic, normocephalic, oropharynx pink and moist, sinuses nontender, normal nasal mucosa/turbinates, thyroid not enlarged or tender, no radiating carotid murmur or bruit and no masses; no JVD  Eyes: pupil equally round and reactive to light (PERRLA) bilaterally, extra-ocular muscle intact (EOMI) bilaterally, lids/conjunctiva normal bilaterally and anicteric bilaterally  Neck: normal, supple, no adenopathy and thyroid normal in size, no nodules or tenderness  CV: normal s1, s2 , regular rhythm, no murmurs, no rubs and no gallops  Lungs: clear to auscultation no w/r/r  Abd: normal bowel sounds, no hepatosplenomegaly, tender in LLQ, no rebound, no guarding, non-distended and no masses  Musculoskeletal: no clubbing, cyanosis and full range of motion of joints: right upper extremity, left upper extremity, right lower extremity and left lower extremity;  2+ Peripheral Pulses, No clubbing, cyanosis, or edema  Neuro: alert, awake & oriented times one (AA&O x 1), cranial Nerves II-XII intact and normal strength  Psych: poor judgment and insight, normal mood/affect and non-anxious  Vascular: 2+ radial and dorsalis pedis pulses B/L equal and symmetric  Skin: no rash, warm and dry

## 2023-11-22 NOTE — ED ADULT NURSE REASSESSMENT NOTE - NS ED NURSE REASSESS COMMENT FT1
Gave report to QUINTON Becker, patient is awake, calm, rr even and unlabored, aware of plan of care, tolerated iv meds well, iv patent, denies sob, denies pain.

## 2023-11-22 NOTE — H&P ADULT - NSHPLABSRESULTS_GEN_ALL_CORE
labs personally reviewed and pertinent [facility] documents/labs/diagnostics reviewed                         7.7    7.24  )-----------( 295      ( 21 Nov 2023 23:15 )             23.1       11-21    137  |  106  |  32.9<H>  ----------------------------<  103<H>  3.3<L>   |  20.0<L>  |  0.88    Ca    7.7<L>      21 Nov 2023 23:15    TPro  5.5<L>  /  Alb  3.1<L>  /  TBili  <0.2<L>  /  DBili  x   /  AST  16  /  ALT  13  /  AlkPhos  61  11-21    PT/INR - ( 21 Nov 2023 23:43 )   PT: >200.0 sec;   INR: >15.00 ratio       PTT - ( 21 Nov 2023 23:43 )  PTT:86.0 sec  Urinalysis Basic - ( 21 Nov 2023 23:15 )    Color: x / Appearance: x / SG: x / pH: x  Gluc: 103 mg/dL / Ketone: x  / Bili: x / Urobili: x   Blood: x / Protein: x / Nitrite: x   Leuk Esterase: x / RBC: x / WBC x   Sq Epi: x / Non Sq Epi: x / Bacteria: x    EKG: read by me, NSR at 87 bpm, no St changes or TWI; QTc 0.483    RADIOLOGY:

## 2023-11-22 NOTE — H&P ADULT - VTE RISK ASSESSMENT
General


Date of Admission





Date of Service:  May 4, 2020


Chief Complaint


The patient is a 29-year-old female admitted with a reason for visit of TIRED.


Source:  RN/MD, EMS, Old records, Other (agent is sedated with Ativan given for 

seizures)


Exam Limitations:  Other (. Patient is sedated)


Timing/Duration:  Other (, unknown)


Severity:  Other (, unknown)


Associated Symptoms:  Other (, lethargy and tiredness)





History of Present Illness


This is a 29 years old white female with past medical history of binge eating 

disorder, bulimia nervosa, rheumatoid arthritis, herpes simplex, type 1 diabetes

mellitus, anemia, vitamin D deficiency, end-stage renal disease on hemodialysis,

and Saturdays was brought to ER by ambulance as patient's father were unable to 

wake her up in a. In the ER. As per records, patient said she woke up fine this 

morning, but she laid back and then she had a hard time. He cannot when she woke

up she had nausea, vomiting and also in ED, patient possibly had as tonic-clonic

seizure and was given Ativan 2 mg. I'm unable to obtain history from patient as 

she is sedated secondary to Ativan. History was obtained from ED M.D., RN 

ambulance records and and reviews patient's outpatient records from rheumatology

clinic.





Home Medications


Scheduled


Amlodipine Besylate (Amlodipine Besylate) 10 Mg Tablet, 10 MG PO DAILY, 

(Reported)


Calcitriol (Calcitriol) 0.25 Mcg Capsule, 0.25 MCG PO DAILY, (Reported)


Cholecalciferol (Vitamin D3) (Vitamin D3) 25 Mcg Capsule, 25 MCG PO DAILY, 

(Reported)


Insulin Glargine,Hum.rec.anlog (Basaglar Kwikpen U-100) 100 Unit/1 Ml 

Insuln.pen, 10 UNIT SC DAILY, (Reported)


Sevelamer Carbonate (Sevelamer Carbonate) 800 Mg Tablet, PO AC, (Reported)





Scheduled PRN


Acetaminophen (Tylenol) 325 Mg Tablet, 650 MG PO Q6HP PRN for PAIN, (Reported)





Miscellaneous Medications


Ferrous Sulfate (Ferosul) 325 Mg Tablet, (Reported)


Ferrous Sulfate (Ferrous Sulfate) 325 Mg Tablet, (Reported)


Insulin Lispro (Admelog Solostar) 100 Unit/1 Ml Insuln.pen, 100 UNIT SC, 

(Reported)


Levothyroxine Sodium (Levothyroxine Sodium) 25 Mcg Tablet, (Reported)


Lorazepam (Lorazepam) 0.5 Mg Tablet, (Reported)


Multivitamin (Tab-A-Gail) 1 Each Tablet, (Reported)


Ranitidine HCl (Ranitidine HCl) 150 Mg Capsule, (Reported)


Sennosides (Senna) 8.6 Mg Tablet, (Reported)





Allergies


Coded Allergies:  


     NSAIDS (Non-Steroidal Anti-Inflamma (Verified  Adverse Reaction, Severe, 

Kidney Failure, 2/3/20)





Past Medical History


Medical History


Binge eating disorder, bulimia nervosa. Rheumatoid arthritis. Herpes simplex 

type 1 diabetes mellitus, anemia, vitamin D deficiency, end-stage renal disease 

on hemodialysis


Surgical History


EGD, colonoscopy and renal biopsy with nephritis 2019.





Family History


Father. Mother both alive with no medical problems. One sister has type 1 

diabetes mellitus





Social History


* Smoker:  Denies


Alcohol:  Denies


Drugs:  denies





A-FIB/CHADSVASC


A-FIB History


Current/History of A-Fib/PAF?:  No





Review of Systems


Constitutional:  Reports: Other (, unable to obtained review of system as pat

ient is sedated)





Physical Examination


General Exam:  Positive: Other (sedated)


Eye Exam:  Positive: Other Eye Symptoms (, unable to lie exam)


ENT Exam:  Positive: Atraumatic, Mucous membr. moist/pink


Neck Exam:  Positive: Supple


Chest Exam:  Positive: Clear to auscultation, Normal air movement


Heart Exam:  Positive: Rate Normal, Normal S1, Normal S2


Abdomen Exam:  Positive: Normal bowel sounds, Soft


Extremity Exam:  Positive: Normal pulses


Skin Exam:  Positive: Nl turgor and temperature


Neuro Exam:  Positive: Other (unable to do neuro exam)


Psych Exam:  Positive: Other (unable to do psych exam)





Vital Signs





Vital Signs








  Date Time  Temp Pulse Resp B/P (MAP) Pulse Ox O2 Delivery O2 Flow Rate FiO2


 


5/4/20 15:01  101      


 


5/4/20 14:50   18 120/77 (91) 99 Room Air  


 


5/4/20 10:33 96.7       











Laboratory Data


Labs 24H


Laboratory Tests 2


5/4/20 10:54: 


Immature Granulocyte % (Auto) 0.5, Neutrophils (%) (Auto) 74.9H, Lymphocytes (%)

(Auto) 18.5L, Monocytes (%) (Auto) 4.2, Eosinophils (%) (Auto) 1.1, Basophils 

(%) (Auto) 0.8, Neutrophils # (Auto) 6.4, Lymphocytes # (Auto) 1.6, Monocytes # 

(Auto) 0.4, Eosinophils # (Auto) 0.1, Basophils # (Auto) 0.1, Nucleated Red 

Blood Cells % (auto) 0.0, Magnesium Level 3.0H, Total Bilirubin 0.6, Direct Bi

lirubin 0.2, Aspartate Amino Transf (AST/SGOT) 21, Alanine Aminotransferase 

(ALT/SGPT) 24, Alkaline Phosphatase 91, Total Protein 6.8, Albumin 3.6, 

Albumin/Globulin Ratio 1.13, Lipase 32L


5/4/20 11:01: 


POC Glucose (Misc Panel) 236H, POC Sodium (Misc Panel) 130L, POC Potassium (Misc

Panel) 5.4H, POC Chloride (Misc Panel) 86L, POC Total CO2 (Misc Panel) 37.0H, 

POC Blood Urea Nitrogen (Misc Panel 27H, POC Ionized Calcium (Misc Panel) 6.0H, 

POC Creatinine (Misc Panel) 7.7H, POC Hematocrit (Misc Panel) 40.0


5/4/20 11:31: 


POC Total CO2 (Misc Panel) 43.0H, POC pH (Misc Panel) 7.398, POC Base Excess 

(Misc Panel) 17.0H, POC Saturated Percent O2 (Misc) 100H, POC pO2 (Misc Panel) 

432.0H, POC pCO2 (Misc Panel) 67.3*H, POC HCO3 (Misc Panel) 41.5H


5/4/20 12:23: 


POC Total CO2 (Misc Panel) 49.0H, POC pH (Misc Panel) 7.425, POC Base Excess 

(Misc Panel) 22.0H, POC Saturated Percent O2 (Misc) 99H, POC pO2 (Misc Panel) 

123.0H, POC pCO2 (Misc Panel) 71.0*H, POC HCO3 (Misc Panel) 46.5H


5/4/20 12:56: 


Immature Granulocyte % (Auto) 0.5, Neutrophils (%) (Auto) 86.1H, Lymphocytes (%)

(Auto) 8.6L, Monocytes (%) (Auto) 4.0, Eosinophils (%) (Auto) 0.2, Basophils (%)

(Auto) 0.6, Neutrophils # (Auto) 9.0H, Lymphocytes # (Auto) 0.9L, Monocytes # 

(Auto) 0.4, Eosinophils # (Auto) 0.0, Basophils # (Auto) 0.1, Nucleated Red Blo

od Cells % (auto) 0.0, Anion Gap 8, Glomerular Filtration Rate 6.5L, Calcium 

Level 13.9H, Total Bilirubin 0.6, Direct Bilirubin 0.2, Aspartate Amino Transf 

(AST/SGOT) 20, Alanine Aminotransferase (ALT/SGPT) 24, Alkaline Phosphatase 88, 

Total Creatine Kinase 99, Creatine Kinase MB 4.6H, Creatine Kinase MB Relative 

Index 4.65H, Troponin I 0.03, Total Protein 6.6, Albumin 3.5, Albumin/Globulin 

Ratio 1.13, Thyroid Stimulating Hormone (TSH) 3.410


5/4/20 14:21: 


Urine Color YELLOW, Urine Appearance CLOUDYH, Urine pH 9.0, Urine Specific 

Gravity 1.006, Urine Protein 2+H, Urine Glucose (UA) 3+H, Urine Ketones TRACEH, 

Urine Blood 1+H, Urine Nitrite NEGATIVE, Urine Bilirubin NEGATIVE, Urine 

Urobilinogen 0.2, Urine Leukocyte Esterase TRACEH, Urine WBC (Auto) 16H, Urine 

RBC (Auto) 3, Urine Hyaline Casts (Auto) 0, Urine Bacteria (Auto) 1+H, Urine 

Squamous Epithelial Cells 0, Urine Amorphous Sediment SMALLH, Urine Sperm (Auto)

, Urine Opiates Screen NEGATIVE, Urine Methadone Screen NEGATIVE, Urine 

Barbiturates Screen NEGATIVE, Urine Phencyclidine Screen NEGATIVE, Urine 

Amphetamines Screen NEGATIVE, Urine Benzodiazepines Screen NEGATIVE, Urine 

Cocaine Metabolite Screen NEGATIVE, Urine Cannabinoids Screen NEGATIVE


CBC/BMP


Laboratory Tests


5/4/20 10:54








5/4/20 12:56








Microbiology





Microbiology


5/4/20 Gastrointestinal Tract Panel (PCR), Received


         Pending


5/4/20 Urine Culture, Received


         Pending





Problems





(1) Hypertensive emergency


Status:  Acute


Problem Text:  This is a 29 years old white female with past medical history of 

binge eating disorder, bulimia nervosa, rheumatoid arthritis, herpes simplex, 

type 1 diabetes mellitus, anemia, vitamin D deficiency, end-stage renal disease 

on hemodialysis and feel tired, fatigue and her father was unable to wake her up

this morning, so she was brought to ED where she started having nausea, vomiting

and O on. She also was observed to experience a tonic-clonic seizure in ED.


Patient was also found to have a systolic blood pressure more than 2:30. She did

receive labetalol 20 mg 2 and hydralazine 10 mg 1, also ativan   2 mg 1


Patient is very stable at home on Norvasc 2.5 mg by mouth daily for hypertension


Patient. Blood pressure right now is 120/77 and heart rate of 101


Chest x-rays, NAD. EKG is sinus tach with no acute ST-T changes, CT of the head 

is negative


WBC count of 10.4, hemoglobin 12, hematocrit 34.7, platelets of 333


Electrolytes are within normal range except potassium is slightly elevated to 

5.5 and sodium is slightly decreased to 132, BUN of 29, creatinine of 7.7


First troponin is 0.03, lipase 32. Urine tox is negative, but UA shows some 

white cells and cloudy appearance


Admit patient to PCU for further workup and management


Saline lock


Hydralazine 10 mg IV every 6 hours when necessary for systolic more than 150


Will restart patient's home medication including Norvasc 10 mg by mouth daily


Is also obtained a 2 more troponins to complete cardiac enzyme sequence


Nephrology  consult, Dr. Birmingham was called from the ED


Also Dr. fraesr   from neurology was called from ED


Diet is consistent carbohydrate diet


Activity as tolerated


DVT prophylaxis with Lovenox





(2) Seizure


Status:  Acute


Problem Text:  Questionable new onset seizure


Patient received Ativan 2 mg IV and at present time patient is sedated and 

sleeping


Neuro check every 4 hours for 24 hours


Seizure precautions


Anyone 1 mg IV when necessary for every 4 hours


Dr. Pascal spoke with Dr. miranda from neurology and he recommended only 

observation ,not to add any new medications and follow-up as an outpatient in 

his office


Patient CT head was essentially negative





(3) ESRD (end stage renal disease)


Status:  Acute


Problem Text:   was called for nephrology consultation


Other recommendations as per nephro





(4) Urinary tract infection


Status:  Acute


Problem Text:  Most likely patient has a urinary tract infection, probably 

secondary to diabetes mellitus


Urine cultures have been ordered


And also get ultrasound of kidneys and bladder to rule out pyelonephritis


Will start Zosyn 2.275 mg IV every 12 hours till the urine cultures are 

available





(5) Diabetes mellitus


Status:  Chronic


Problem Text:  Diabetes mellitus type 1


Fingerstick blood sugar every before meals and at bedtime with coverage


Restart all patient's home meds once the med rec is complete by pharmacy








Plan / VTE


VTE Prophylaxis Ordered?:  Yes











NARCISO PURDY MD               May 4, 2020 15:39 <<--- Click to launch

## 2023-11-22 NOTE — CHART NOTE - NSCHARTNOTEFT_GEN_A_CORE
Pt is seen in am , chart reviewed   she is c/o bloody loose BM   no abd pain   GI consult appreciated     Vital Signs Last 24 Hrs  T(C): 36.7 (22 Nov 2023 07:30), Max: 36.9 (22 Nov 2023 00:12)  T(F): 98.1 (22 Nov 2023 07:30), Max: 98.5 (22 Nov 2023 00:12)  HR: 80 (22 Nov 2023 07:30) (76 - 95)  BP: 93/52 (22 Nov 2023 07:30) (93/52 - 139/83)  BP(mean): --  RR: 18 (22 Nov 2023 07:30) (16 - 20)  SpO2: 98% (22 Nov 2023 07:30) (96% - 99%)    Parameters below as of 22 Nov 2023 07:30  Patient On (Oxygen Delivery Method): room air  Lungs : CTA bilateral   Heart : regular s1 /s2   abd soft no tenderness , Bs +   Ext : no edema                           7.9    7.41  )-----------( 233      ( 22 Nov 2023 06:45 )             24.0   11-21    137  |  106  |  32.9<H>  ----------------------------<  103<H>  3.3<L>   |  20.0<L>  |  0.88    Ca    7.7<L>      21 Nov 2023 23:15    TPro  5.5<L>  /  Alb  3.1<L>  /  TBili  <0.2<L>  /  DBili  x   /  AST  16  /  ALT  13  /  AlkPhos  61  11-21    Diarrhea / rectal bleed   anemia of blood loss   hypokalemia     will repeat lytes check ion studies   GI stool studies ordered   transfuse to keep hb over 8   cont PPI

## 2023-11-22 NOTE — H&P ADULT - ASSESSMENT
ASSESSMENT:  This is a 83 year old female w/ PMHx of dementia, AAOX1 - self, CVA S/P MOHAN X 3 months, presents for evaluation of rectal bleeding and diarrhea.     PLAN:  1.Supratherapeutic INR on warfarin for unknown reasons  w/ possible GIB except recent CVA?  -will monitor on telemetry  -afebrile, no leukocytosis  -EKG as above  -will keep NPO for now  -will start LR  -LFTs within normal limits  -hemoglobin 7.7  -hematocrit 23.1  -platelet count 295  -PT >200  -INR >15, S/P phytonadione 5mg po X 1, will f/u repeat INR  -PTT 86  -potassium 3.3, S/P 40mEq potassium X 1, will f/u repeat in AM  -CO2 20  -BUN 32.9  -Cr 0.88  -S/P type and screen in ER  -will hold coumadin for now  -GI consulted by ER, will f/u recommendations  -will place on fall precaution    2.CVA    3.Dementia    4.DVT PPx  -will place B/L LE SCDs ASSESSMENT:  This is a 83 year old female w/ PMHx of dementia, AAOX1 - self, CVA S/P MOHAN X 3 months, presents for evaluation of rectal bleeding and diarrhea.     PLAN:  1.Supratherapeutic INR on warfarin for unknown reasons  w/ possible GIB except recent CVA?  -will monitor on telemetry  -afebrile, no leukocytosis  -EKG as above  -will keep NPO for now  -will start LR  -LFTs within normal limits  -hemoglobin 7.7  -hematocrit 23.1  -platelet count 295  -PT >200  -INR >15, S/P phytonadione 5mg po X 1, will f/u repeat INR  -PTT 86  -potassium 3.3, S/P 40mEq potassium X 1, will f/u repeat in AM  -CO2 20  -BUN 32.9  -Cr 0.88  -S/P type and screen in ER  -will hold coumadin for now  -GI consulted by ER, will f/u recommendations  -will place on fall precaution  -will start protonix 40mg IV QD    2.CVA    3.Dementia  -will continue trazodone 50mg QD    4.DVT PPx  -will place B/L LE SCDs    *patient does not know her medications, completed med rec w/ insight from [facility]

## 2023-11-23 DIAGNOSIS — K62.5 HEMORRHAGE OF ANUS AND RECTUM: ICD-10-CM

## 2023-11-23 LAB
ALBUMIN SERPL ELPH-MCNC: 2.6 G/DL — LOW (ref 3.3–5.2)
ALBUMIN SERPL ELPH-MCNC: 2.6 G/DL — LOW (ref 3.3–5.2)
ALP SERPL-CCNC: 58 U/L — SIGNIFICANT CHANGE UP (ref 40–120)
ALP SERPL-CCNC: 58 U/L — SIGNIFICANT CHANGE UP (ref 40–120)
ALT FLD-CCNC: 12 U/L — SIGNIFICANT CHANGE UP
ALT FLD-CCNC: 12 U/L — SIGNIFICANT CHANGE UP
ANION GAP SERPL CALC-SCNC: 10 MMOL/L — SIGNIFICANT CHANGE UP (ref 5–17)
ANION GAP SERPL CALC-SCNC: 10 MMOL/L — SIGNIFICANT CHANGE UP (ref 5–17)
ANION GAP SERPL CALC-SCNC: 8 MMOL/L — SIGNIFICANT CHANGE UP (ref 5–17)
ANION GAP SERPL CALC-SCNC: 8 MMOL/L — SIGNIFICANT CHANGE UP (ref 5–17)
APTT BLD: 30.1 SEC — SIGNIFICANT CHANGE UP (ref 24.5–35.6)
APTT BLD: 30.1 SEC — SIGNIFICANT CHANGE UP (ref 24.5–35.6)
AST SERPL-CCNC: 16 U/L — SIGNIFICANT CHANGE UP
AST SERPL-CCNC: 16 U/L — SIGNIFICANT CHANGE UP
BASOPHILS # BLD AUTO: 0.04 K/UL — SIGNIFICANT CHANGE UP (ref 0–0.2)
BASOPHILS # BLD AUTO: 0.04 K/UL — SIGNIFICANT CHANGE UP (ref 0–0.2)
BASOPHILS NFR BLD AUTO: 0.6 % — SIGNIFICANT CHANGE UP (ref 0–2)
BASOPHILS NFR BLD AUTO: 0.6 % — SIGNIFICANT CHANGE UP (ref 0–2)
BILIRUB SERPL-MCNC: 0.2 MG/DL — LOW (ref 0.4–2)
BILIRUB SERPL-MCNC: 0.2 MG/DL — LOW (ref 0.4–2)
BUN SERPL-MCNC: 10.6 MG/DL — SIGNIFICANT CHANGE UP (ref 8–20)
BUN SERPL-MCNC: 10.6 MG/DL — SIGNIFICANT CHANGE UP (ref 8–20)
BUN SERPL-MCNC: 9.9 MG/DL — SIGNIFICANT CHANGE UP (ref 8–20)
BUN SERPL-MCNC: 9.9 MG/DL — SIGNIFICANT CHANGE UP (ref 8–20)
CALCIUM SERPL-MCNC: 7.7 MG/DL — LOW (ref 8.4–10.5)
CALCIUM SERPL-MCNC: 7.7 MG/DL — LOW (ref 8.4–10.5)
CALCIUM SERPL-MCNC: 7.8 MG/DL — LOW (ref 8.4–10.5)
CALCIUM SERPL-MCNC: 7.8 MG/DL — LOW (ref 8.4–10.5)
CHLORIDE SERPL-SCNC: 108 MMOL/L — SIGNIFICANT CHANGE UP (ref 96–108)
CHLORIDE SERPL-SCNC: 108 MMOL/L — SIGNIFICANT CHANGE UP (ref 96–108)
CHLORIDE SERPL-SCNC: 109 MMOL/L — HIGH (ref 96–108)
CHLORIDE SERPL-SCNC: 109 MMOL/L — HIGH (ref 96–108)
CO2 SERPL-SCNC: 20 MMOL/L — LOW (ref 22–29)
CO2 SERPL-SCNC: 20 MMOL/L — LOW (ref 22–29)
CO2 SERPL-SCNC: 22 MMOL/L — SIGNIFICANT CHANGE UP (ref 22–29)
CO2 SERPL-SCNC: 22 MMOL/L — SIGNIFICANT CHANGE UP (ref 22–29)
CREAT SERPL-MCNC: 0.59 MG/DL — SIGNIFICANT CHANGE UP (ref 0.5–1.3)
CREAT SERPL-MCNC: 0.59 MG/DL — SIGNIFICANT CHANGE UP (ref 0.5–1.3)
CREAT SERPL-MCNC: 0.63 MG/DL — SIGNIFICANT CHANGE UP (ref 0.5–1.3)
CREAT SERPL-MCNC: 0.63 MG/DL — SIGNIFICANT CHANGE UP (ref 0.5–1.3)
EGFR: 88 ML/MIN/1.73M2 — SIGNIFICANT CHANGE UP
EGFR: 88 ML/MIN/1.73M2 — SIGNIFICANT CHANGE UP
EGFR: 89 ML/MIN/1.73M2 — SIGNIFICANT CHANGE UP
EGFR: 89 ML/MIN/1.73M2 — SIGNIFICANT CHANGE UP
EOSINOPHIL # BLD AUTO: 0.07 K/UL — SIGNIFICANT CHANGE UP (ref 0–0.5)
EOSINOPHIL # BLD AUTO: 0.07 K/UL — SIGNIFICANT CHANGE UP (ref 0–0.5)
EOSINOPHIL NFR BLD AUTO: 1.1 % — SIGNIFICANT CHANGE UP (ref 0–6)
EOSINOPHIL NFR BLD AUTO: 1.1 % — SIGNIFICANT CHANGE UP (ref 0–6)
GLUCOSE SERPL-MCNC: 107 MG/DL — HIGH (ref 70–99)
GLUCOSE SERPL-MCNC: 107 MG/DL — HIGH (ref 70–99)
GLUCOSE SERPL-MCNC: 97 MG/DL — SIGNIFICANT CHANGE UP (ref 70–99)
GLUCOSE SERPL-MCNC: 97 MG/DL — SIGNIFICANT CHANGE UP (ref 70–99)
HCT VFR BLD CALC: 24 % — LOW (ref 34.5–45)
HCT VFR BLD CALC: 24 % — LOW (ref 34.5–45)
HCT VFR BLD CALC: 26.3 % — LOW (ref 34.5–45)
HCT VFR BLD CALC: 26.3 % — LOW (ref 34.5–45)
HGB BLD-MCNC: 8.1 G/DL — LOW (ref 11.5–15.5)
HGB BLD-MCNC: 8.1 G/DL — LOW (ref 11.5–15.5)
HGB BLD-MCNC: 8.9 G/DL — LOW (ref 11.5–15.5)
HGB BLD-MCNC: 8.9 G/DL — LOW (ref 11.5–15.5)
IMM GRANULOCYTES NFR BLD AUTO: 0.5 % — SIGNIFICANT CHANGE UP (ref 0–0.9)
IMM GRANULOCYTES NFR BLD AUTO: 0.5 % — SIGNIFICANT CHANGE UP (ref 0–0.9)
INR BLD: 1.31 RATIO — HIGH (ref 0.85–1.18)
INR BLD: 1.31 RATIO — HIGH (ref 0.85–1.18)
LYMPHOCYTES # BLD AUTO: 0.81 K/UL — LOW (ref 1–3.3)
LYMPHOCYTES # BLD AUTO: 0.81 K/UL — LOW (ref 1–3.3)
LYMPHOCYTES # BLD AUTO: 13.1 % — SIGNIFICANT CHANGE UP (ref 13–44)
LYMPHOCYTES # BLD AUTO: 13.1 % — SIGNIFICANT CHANGE UP (ref 13–44)
MAGNESIUM SERPL-MCNC: 1.8 MG/DL — SIGNIFICANT CHANGE UP (ref 1.6–2.6)
MAGNESIUM SERPL-MCNC: 1.8 MG/DL — SIGNIFICANT CHANGE UP (ref 1.6–2.6)
MAGNESIUM SERPL-MCNC: 2 MG/DL — SIGNIFICANT CHANGE UP (ref 1.6–2.6)
MAGNESIUM SERPL-MCNC: 2 MG/DL — SIGNIFICANT CHANGE UP (ref 1.6–2.6)
MCHC RBC-ENTMCNC: 32 PG — SIGNIFICANT CHANGE UP (ref 27–34)
MCHC RBC-ENTMCNC: 32 PG — SIGNIFICANT CHANGE UP (ref 27–34)
MCHC RBC-ENTMCNC: 32.6 PG — SIGNIFICANT CHANGE UP (ref 27–34)
MCHC RBC-ENTMCNC: 32.6 PG — SIGNIFICANT CHANGE UP (ref 27–34)
MCHC RBC-ENTMCNC: 33.8 GM/DL — SIGNIFICANT CHANGE UP (ref 32–36)
MCV RBC AUTO: 94.9 FL — SIGNIFICANT CHANGE UP (ref 80–100)
MCV RBC AUTO: 94.9 FL — SIGNIFICANT CHANGE UP (ref 80–100)
MCV RBC AUTO: 96.3 FL — SIGNIFICANT CHANGE UP (ref 80–100)
MCV RBC AUTO: 96.3 FL — SIGNIFICANT CHANGE UP (ref 80–100)
MONOCYTES # BLD AUTO: 0.55 K/UL — SIGNIFICANT CHANGE UP (ref 0–0.9)
MONOCYTES # BLD AUTO: 0.55 K/UL — SIGNIFICANT CHANGE UP (ref 0–0.9)
MONOCYTES NFR BLD AUTO: 8.9 % — SIGNIFICANT CHANGE UP (ref 2–14)
MONOCYTES NFR BLD AUTO: 8.9 % — SIGNIFICANT CHANGE UP (ref 2–14)
NEUTROPHILS # BLD AUTO: 4.66 K/UL — SIGNIFICANT CHANGE UP (ref 1.8–7.4)
NEUTROPHILS # BLD AUTO: 4.66 K/UL — SIGNIFICANT CHANGE UP (ref 1.8–7.4)
NEUTROPHILS NFR BLD AUTO: 75.8 % — SIGNIFICANT CHANGE UP (ref 43–77)
NEUTROPHILS NFR BLD AUTO: 75.8 % — SIGNIFICANT CHANGE UP (ref 43–77)
PHOSPHATE SERPL-MCNC: 2.1 MG/DL — LOW (ref 2.4–4.7)
PLATELET # BLD AUTO: 243 K/UL — SIGNIFICANT CHANGE UP (ref 150–400)
PLATELET # BLD AUTO: 243 K/UL — SIGNIFICANT CHANGE UP (ref 150–400)
PLATELET # BLD AUTO: 261 K/UL — SIGNIFICANT CHANGE UP (ref 150–400)
PLATELET # BLD AUTO: 261 K/UL — SIGNIFICANT CHANGE UP (ref 150–400)
POTASSIUM SERPL-MCNC: 3.3 MMOL/L — LOW (ref 3.5–5.3)
POTASSIUM SERPL-MCNC: 3.3 MMOL/L — LOW (ref 3.5–5.3)
POTASSIUM SERPL-MCNC: 3.4 MMOL/L — LOW (ref 3.5–5.3)
POTASSIUM SERPL-MCNC: 3.4 MMOL/L — LOW (ref 3.5–5.3)
POTASSIUM SERPL-SCNC: 3.3 MMOL/L — LOW (ref 3.5–5.3)
POTASSIUM SERPL-SCNC: 3.3 MMOL/L — LOW (ref 3.5–5.3)
POTASSIUM SERPL-SCNC: 3.4 MMOL/L — LOW (ref 3.5–5.3)
POTASSIUM SERPL-SCNC: 3.4 MMOL/L — LOW (ref 3.5–5.3)
PROT SERPL-MCNC: 5 G/DL — LOW (ref 6.6–8.7)
PROT SERPL-MCNC: 5 G/DL — LOW (ref 6.6–8.7)
PROTHROM AB SERPL-ACNC: 14.4 SEC — HIGH (ref 9.5–13)
PROTHROM AB SERPL-ACNC: 14.4 SEC — HIGH (ref 9.5–13)
RBC # BLD: 2.53 M/UL — LOW (ref 3.8–5.2)
RBC # BLD: 2.53 M/UL — LOW (ref 3.8–5.2)
RBC # BLD: 2.73 M/UL — LOW (ref 3.8–5.2)
RBC # BLD: 2.73 M/UL — LOW (ref 3.8–5.2)
RBC # FLD: 14.5 % — SIGNIFICANT CHANGE UP (ref 10.3–14.5)
RBC # FLD: 14.5 % — SIGNIFICANT CHANGE UP (ref 10.3–14.5)
RBC # FLD: 14.8 % — HIGH (ref 10.3–14.5)
RBC # FLD: 14.8 % — HIGH (ref 10.3–14.5)
SODIUM SERPL-SCNC: 138 MMOL/L — SIGNIFICANT CHANGE UP (ref 135–145)
SODIUM SERPL-SCNC: 138 MMOL/L — SIGNIFICANT CHANGE UP (ref 135–145)
SODIUM SERPL-SCNC: 139 MMOL/L — SIGNIFICANT CHANGE UP (ref 135–145)
SODIUM SERPL-SCNC: 139 MMOL/L — SIGNIFICANT CHANGE UP (ref 135–145)
WBC # BLD: 5.16 K/UL — SIGNIFICANT CHANGE UP (ref 3.8–10.5)
WBC # BLD: 5.16 K/UL — SIGNIFICANT CHANGE UP (ref 3.8–10.5)
WBC # BLD: 6.16 K/UL — SIGNIFICANT CHANGE UP (ref 3.8–10.5)
WBC # BLD: 6.16 K/UL — SIGNIFICANT CHANGE UP (ref 3.8–10.5)
WBC # FLD AUTO: 5.16 K/UL — SIGNIFICANT CHANGE UP (ref 3.8–10.5)
WBC # FLD AUTO: 5.16 K/UL — SIGNIFICANT CHANGE UP (ref 3.8–10.5)
WBC # FLD AUTO: 6.16 K/UL — SIGNIFICANT CHANGE UP (ref 3.8–10.5)
WBC # FLD AUTO: 6.16 K/UL — SIGNIFICANT CHANGE UP (ref 3.8–10.5)

## 2023-11-23 PROCEDURE — 99233 SBSQ HOSP IP/OBS HIGH 50: CPT

## 2023-11-23 RX ORDER — IRON SUCROSE 20 MG/ML
200 INJECTION, SOLUTION INTRAVENOUS ONCE
Refills: 0 | Status: COMPLETED | OUTPATIENT
Start: 2023-11-23 | End: 2023-11-23

## 2023-11-23 RX ORDER — OLANZAPINE 15 MG/1
2.5 TABLET, FILM COATED ORAL ONCE
Refills: 0 | Status: COMPLETED | OUTPATIENT
Start: 2023-11-23 | End: 2023-11-23

## 2023-11-23 RX ADMIN — MAGNESIUM OXIDE 400 MG ORAL TABLET 400 MILLIGRAM(S): 241.3 TABLET ORAL at 17:13

## 2023-11-23 RX ADMIN — PREGABALIN 1000 MICROGRAM(S): 225 CAPSULE ORAL at 11:18

## 2023-11-23 RX ADMIN — ESCITALOPRAM OXALATE 10 MILLIGRAM(S): 10 TABLET, FILM COATED ORAL at 11:18

## 2023-11-23 RX ADMIN — IRON SUCROSE 200 MILLIGRAM(S): 20 INJECTION, SOLUTION INTRAVENOUS at 17:13

## 2023-11-23 RX ADMIN — MAGNESIUM OXIDE 400 MG ORAL TABLET 400 MILLIGRAM(S): 241.3 TABLET ORAL at 11:18

## 2023-11-23 RX ADMIN — PANTOPRAZOLE SODIUM 40 MILLIGRAM(S): 20 TABLET, DELAYED RELEASE ORAL at 11:18

## 2023-11-23 RX ADMIN — OLANZAPINE 2.5 MILLIGRAM(S): 15 TABLET, FILM COATED ORAL at 23:50

## 2023-11-23 RX ADMIN — Medication 50 MILLIGRAM(S): at 11:18

## 2023-11-23 NOTE — CHART NOTE - NSCHARTNOTEFT_GEN_A_CORE
Chart/Event Note  [facility] [id number], 83y, Female  [id number]    Notified by RN that the above patient was agitated.     Patient sitting at edge of bed yelling, confused, repeatedly attempting to get out of bed. Calming and re-orientation measures have failed.   Patient has history of dementia and confusion.   -Will give Zyprexa 2.5mg IM x1 and reassess after intervention

## 2023-11-23 NOTE — PROGRESS NOTE ADULT - NS ATTEST RISK PROBLEM GEN_ALL_CORE FT
admitted with rectal bleeding, drop in hemoglobin, supratherapeutic INR   Patient has not had further rectal bleeding.    Hemoglobin is now stable At 8.9  CBC ordered for tomorrow  I reviewed hospitalist note–patient with a history of dementia.  On trazodone. Pt  May not be a good candidate for anticoagulation.  She had supratherapeutic therapeutic INR.  Her compliance may be poor. May not be taking her meds appropriately.     May advance diet.  Patient adamantly refuses colonoscopy. However it is noted in the chart that she has dementia.  She does not want me to speak to her family regarding colonoscopy.    I spoke to Dr. Rivas regarding plan for the patient.  I told him that patient is refusing colonoscopy.

## 2023-11-23 NOTE — PROGRESS NOTE ADULT - SUBJECTIVE AND OBJECTIVE BOX
This is a 83 year old female w/ PMHx of dementia, CVA S/P MOHAN X 3 months, presents for evaluation of rectal bleeding and diarrhea. Per EMR, diarrhea X 2 months, and 2 weeks of BRBPR.  She was started on warfarin for unclear reasons 3 months prior while admitted for CVA at [facility]. No f/u since.     ROS: Patient unable to provide history  CONSTITUTIONAL: Denies fever, chills, fatigue  HEENT: Denies acute changes in vision and hearing  CARDIO: Denies CP, SOB, palpitations  PULM: Denies cough, wheezing, SOB  ABD: Denies abd pain, n/v/d/c  : Denies dysuria, urinary frequency  NEURO: Denies HA, numbness/tingling  EXTREMITIES: Denies LE swelling, calf pain    MEDICATIONS  (STANDING):  cyanocobalamin Injectable 1000 MICROGram(s) IntraMuscular daily  escitalopram 10 milliGRAM(s) Oral daily  lactated ringers. 1000 milliLiter(s) (50 mL/Hr) IV Continuous <Continuous>  magnesium oxide 400 milliGRAM(s) Oral two times a day with meals  pantoprazole  Injectable 40 milliGRAM(s) IV Push daily  traZODone 50 milliGRAM(s) Oral daily    MEDICATIONS  (PRN):  acetaminophen     Tablet .. 650 milliGRAM(s) Oral every 6 hours PRN Temp greater or equal to 38C (100.4F), Mild Pain (1 - 3)  aluminum hydroxide/magnesium hydroxide/simethicone Suspension 30 milliLiter(s) Oral every 4 hours PRN Dyspepsia  melatonin 3 milliGRAM(s) Oral at bedtime PRN Insomnia  ondansetron Injectable 4 milliGRAM(s) IV Push every 8 hours PRN Nausea and/or Vomiting      Allergies    No Known Allergies    Intolerances          Vital Signs Last 24 Hrs  T(C): 36.8 (23 Nov 2023 08:49), Max: 36.9 (22 Nov 2023 21:15)  T(F): 98.2 (23 Nov 2023 08:49), Max: 98.5 (22 Nov 2023 21:15)  HR: 73 (23 Nov 2023 08:49) (72 - 87)  BP: 103/62 (23 Nov 2023 08:49) (97/58 - 133/61)  BP(mean): --  RR: 18 (23 Nov 2023 08:49) (17 - 18)  SpO2: 97% (23 Nov 2023 08:49) (96% - 97%)    Parameters below as of 23 Nov 2023 08:49  Patient On (Oxygen Delivery Method): room air        PHYSICAL EXAM:  GENERAL: A&Ox3, no acute distress, comfortably in bed  HEENT:  Atraumatic, normocephalic, non-icteric sclera, no cervical LAD, neck supple, no JVD, thyroid normal  NEURO/PSYCH:  No focal deficits, normal affect, strength 5/5 all 4 extremities  LUNGS: CTAB, no wrr, non-labored breathing  HEART: RRR, no murmur appreciated  ABD: Soft, non-tender, non-distended, no organomegaly, no appreciable masses, +bs all 4 quadrants  EXTREMITIES:  Nontender, no clubbing, cyanosis, or edema  SKIN: No rashes or lesions      LABS:                        8.1    5.16  )-----------( 243      ( 23 Nov 2023 06:31 )             24.0     11-23    138  |  108  |  10.6  ----------------------------<  107<H>  3.3<L>   |  22.0  |  0.59    Ca    7.8<L>      23 Nov 2023 06:31  Phos  2.1     11-23  Mg     2.0     11-23    TPro  5.5<L>  /  Alb  3.1<L>  /  TBili  <0.2<L>  /  DBili  x   /  AST  16  /  ALT  13  /  AlkPhos  61  11-21    PT/INR - ( 23 Nov 2023 06:31 )   PT: 14.4 sec;   INR: 1.31 ratio         PTT - ( 23 Nov 2023 06:31 )  PTT:30.1 sec  Urinalysis Basic - ( 23 Nov 2023 06:31 )    Color: x / Appearance: x / SG: x / pH: x  Gluc: 107 mg/dL / Ketone: x  / Bili: x / Urobili: x   Blood: x / Protein: x / Nitrite: x   Leuk Esterase: x / RBC: x / WBC x   Sq Epi: x / Non Sq Epi: x / Bacteria: x      CAPILLARY BLOOD GLUCOSE          RADIOLOGY & ADDITIONAL TESTS:      Imaging Personally Reviewed:  [  ] YES  [  ] NO    Consultant(s) Notes Reviewed:  [  ] YES  [  ] NO    Care Discussed with Consultants/Other Providers [  ] YES  [  ] NO    Plan of Care discussed with Housestaff [ X ]YES [  ] NO Patient is an 83 YOF w/ PMHx of dementia, CVA S/P MOHAN X 3 months, presents for evaluation of rectal bleeding and diarrhea. Per EMR, diarrhea X 2 months, and 2 weeks of BRBPR.  She was started on warfarin for unclear reasons 3 months prior while admitted for CVA at [facility]. No f/u since.     ROS: Patient unable to provide history  CONSTITUTIONAL: Denies fever, chills, fatigue  HEENT: Denies acute changes in vision and hearing  CARDIO: Denies CP, SOB, palpitations  PULM: Denies cough, wheezing, SOB  ABD: Denies abd pain, n/v/d/c  : Denies dysuria, urinary frequency  NEURO: Denies HA, numbness/tingling  EXTREMITIES: Denies LE swelling, calf pain    MEDICATIONS  (STANDING):  cyanocobalamin Injectable 1000 MICROGram(s) IntraMuscular daily  escitalopram 10 milliGRAM(s) Oral daily  lactated ringers. 1000 milliLiter(s) (50 mL/Hr) IV Continuous <Continuous>  magnesium oxide 400 milliGRAM(s) Oral two times a day with meals  pantoprazole  Injectable 40 milliGRAM(s) IV Push daily  traZODone 50 milliGRAM(s) Oral daily    MEDICATIONS  (PRN):  acetaminophen     Tablet .. 650 milliGRAM(s) Oral every 6 hours PRN Temp greater or equal to 38C (100.4F), Mild Pain (1 - 3)  aluminum hydroxide/magnesium hydroxide/simethicone Suspension 30 milliLiter(s) Oral every 4 hours PRN Dyspepsia  melatonin 3 milliGRAM(s) Oral at bedtime PRN Insomnia  ondansetron Injectable 4 milliGRAM(s) IV Push every 8 hours PRN Nausea and/or Vomiting      Allergies    No Known Allergies    Intolerances          Vital Signs Last 24 Hrs  T(C): 36.8 (23 Nov 2023 08:49), Max: 36.9 (22 Nov 2023 21:15)  T(F): 98.2 (23 Nov 2023 08:49), Max: 98.5 (22 Nov 2023 21:15)  HR: 73 (23 Nov 2023 08:49) (72 - 87)  BP: 103/62 (23 Nov 2023 08:49) (97/58 - 133/61)  BP(mean): --  RR: 18 (23 Nov 2023 08:49) (17 - 18)  SpO2: 97% (23 Nov 2023 08:49) (96% - 97%)    Parameters below as of 23 Nov 2023 08:49  Patient On (Oxygen Delivery Method): room air        PHYSICAL EXAM:  GENERAL: A&Ox3, no acute distress, comfortably in bed  HEENT:  Atraumatic, normocephalic, non-icteric sclera, no cervical LAD, neck supple, no JVD, thyroid normal  NEURO/PSYCH:  No focal deficits, normal affect, strength 5/5 all 4 extremities  LUNGS: CTAB, no wrr, non-labored breathing  HEART: RRR, no murmur appreciated  ABD: Soft, non-tender, non-distended, no organomegaly, no appreciable masses, +bs all 4 quadrants  EXTREMITIES:  Nontender, no clubbing, cyanosis, or edema  SKIN: No rashes or lesions      LABS:                        8.1    5.16  )-----------( 243      ( 23 Nov 2023 06:31 )             24.0     11-23    138  |  108  |  10.6  ----------------------------<  107<H>  3.3<L>   |  22.0  |  0.59    Ca    7.8<L>      23 Nov 2023 06:31  Phos  2.1     11-23  Mg     2.0     11-23    TPro  5.5<L>  /  Alb  3.1<L>  /  TBili  <0.2<L>  /  DBili  x   /  AST  16  /  ALT  13  /  AlkPhos  61  11-21    PT/INR - ( 23 Nov 2023 06:31 )   PT: 14.4 sec;   INR: 1.31 ratio         PTT - ( 23 Nov 2023 06:31 )  PTT:30.1 sec  Urinalysis Basic - ( 23 Nov 2023 06:31 )    Color: x / Appearance: x / SG: x / pH: x  Gluc: 107 mg/dL / Ketone: x  / Bili: x / Urobili: x   Blood: x / Protein: x / Nitrite: x   Leuk Esterase: x / RBC: x / WBC x   Sq Epi: x / Non Sq Epi: x / Bacteria: x      CAPILLARY BLOOD GLUCOSE          RADIOLOGY & ADDITIONAL TESTS:      Imaging Personally Reviewed:  [  ] YES  [  ] NO    Consultant(s) Notes Reviewed:  [  ] YES  [  ] NO    Care Discussed with Consultants/Other Providers [  ] YES  [  ] NO    Plan of Care discussed with Housestaff [ X ]YES [  ] NO

## 2023-11-23 NOTE — PROGRESS NOTE ADULT - SUBJECTIVE AND OBJECTIVE BOX
Patient is a 83y old  Female who presents with a chief complaint of rectal bleeding (23 Nov 2023 11:14)      HPI:  This is a 83 year old female w/ PMHx of dementia, AAOX1 - self, CVA S/P MOHAN X 3 months, presents for evaluation of rectal bleeding and diarrhea.  \Owner would report Carafate patient has no further rectal bleeding.  Hemoglobin is 8.9 which is stable.  She has no abdominal pain.  Patient states she has never had a colonoscopy before.  I explained to the patient that bleeding was likely due to her very elevated INR however there may be an underlying cause for her bleeding.  I told her without colonoscopy I will not be able to evaluate for cause of bleeding.  Patient noted to have chronic diarrhea.  Stool studies negative however fecal elastase and calprotectin are still pending    REVIEW OF SYSTEMS:  Constitutional: No fever, weight loss or fatigue  ENMT:  No difficulty hearing, tinnitus, vertigo; No sinus or throat pain  Respiratory: No cough, wheezing, chills or hemoptysis  Cardiovascular: No chest pain, palpitations, dizziness or leg swelling  Gastrointestinal: No abdominal or epigastric pain. No nausea, vomiting or hematemesis; + diarrhea No melena or hematochezia.  Skin: No itching, burning, rashes or lesions   Musculoskeletal: No joint pain or swelling; No muscle, back or extremity pain    PAST MEDICAL & SURGICAL HISTORY:  Dementia      CVA (cerebrovascular accident)          FAMILY HISTORY:      SOCIAL HISTORY:  Smoking Status: [ ] Current, [ ] Former, [ ] Never  Pack Years:  [  ] EtOH-no  [  ] IVDA    MEDICATIONS:  MEDICATIONS  (STANDING):  cyanocobalamin Injectable 1000 MICROGram(s) IntraMuscular daily  escitalopram 10 milliGRAM(s) Oral daily  iron sucrose Injectable 200 milliGRAM(s) IV Push once  lactated ringers. 1000 milliLiter(s) (50 mL/Hr) IV Continuous <Continuous>  magnesium oxide 400 milliGRAM(s) Oral two times a day with meals  pantoprazole  Injectable 40 milliGRAM(s) IV Push daily  traZODone 50 milliGRAM(s) Oral daily    MEDICATIONS  (PRN):  acetaminophen     Tablet .. 650 milliGRAM(s) Oral every 6 hours PRN Temp greater or equal to 38C (100.4F), Mild Pain (1 - 3)  aluminum hydroxide/magnesium hydroxide/simethicone Suspension 30 milliLiter(s) Oral every 4 hours PRN Dyspepsia  melatonin 3 milliGRAM(s) Oral at bedtime PRN Insomnia  ondansetron Injectable 4 milliGRAM(s) IV Push every 8 hours PRN Nausea and/or Vomiting      Allergies    No Known Allergies    Intolerances        Vital Signs Last 24 Hrs  T(C): 36.8 (23 Nov 2023 08:49), Max: 36.9 (22 Nov 2023 21:15)  T(F): 98.2 (23 Nov 2023 08:49), Max: 98.5 (22 Nov 2023 21:15)  HR: 73 (23 Nov 2023 08:49) (72 - 87)  BP: 103/62 (23 Nov 2023 08:49) (97/58 - 133/61)  BP(mean): --  RR: 18 (23 Nov 2023 08:49) (17 - 18)  SpO2: 97% (23 Nov 2023 08:49) (96% - 97%)    Parameters below as of 23 Nov 2023 08:49  Patient On (Oxygen Delivery Method): room air        11-22 @ 07:01  -  11-23 @ 07:00  --------------------------------------------------------  IN: 0 mL / OUT: 300 mL / NET: -300 mL          PHYSICAL EXAM:    General: ; in no acute distress  HEENT: MMM, conjunctiva and sclera clear  H-RRR  L-CTA  Gastrointestinal: Soft, non-tender non-distended; Normal bowel sounds; No rebound or guarding  Extremities: Normal range of motion, No clubbing, cyanosis or edema  Neurological: Alert and oriented x3  Skin: Warm and dry. No obvious rash      LABS:                        8.9    6.16  )-----------( 261      ( 23 Nov 2023 10:55 )             26.3     23 Nov 2023 10:55    139    |  109    |  9.9    ----------------------------<  97     3.4     |  20.0   |  0.63     Ca    7.7        23 Nov 2023 10:55  Phos  2.1       23 Nov 2023 10:55  Mg     1.8       23 Nov 2023 10:55    TPro  5.0    /  Alb  2.6    /  TBili  0.2    /  DBili  x      /  AST  16     /  ALT  12     /  AlkPhos  58     / Amylase x      /Lipase x      23 Nov 2023 10:55        Iron Total: 34 ug/dL (11-22-23 @ 17:20)  Iron - Total Binding Capacity.: 229 ug/dL (11-22-23 @ 17:20)        RADIOLOGY & ADDITIONAL STUDIES:

## 2023-11-23 NOTE — PROGRESS NOTE ADULT - SUBJECTIVE AND OBJECTIVE BOX
Chief Complaint: This is a 83y old woman patient being seen in follow-up consultation for diarrhea, rectal bleeding     Interval HPI / 24H events:  Patient seen and evaluated at bedside, reporting no complaints. Reports no further episodes of rectal bleed overnight. Patient denies abdominal pain, nausea, vomiting. Her hemoglobin remain stable, 8.1 gm this morning. INR reported 1.3 this morning.      Review of Systems:   . Constitutional: No fever, chills  . HEENT: Negative  · Respiratory and Thorax: No shortness of breath, no cough  · Cardiovascular: No chest pain, palpitation, no dizziness  · Gastrointestinal: see above  · Genitourinary: No hematuria  · Musculoskeletal: Negative  · Neurological: negative  · Psychiatric: no agitation, no anxiety      PAST MEDICAL/SURGICAL HISTORY:  Dementia    CVA (cerebrovascular accident)      MEDICATIONS  (STANDING):  cyanocobalamin Injectable 1000 MICROGram(s) IntraMuscular daily  escitalopram 10 milliGRAM(s) Oral daily  lactated ringers. 1000 milliLiter(s) (50 mL/Hr) IV Continuous <Continuous>  magnesium oxide 400 milliGRAM(s) Oral two times a day with meals  pantoprazole  Injectable 40 milliGRAM(s) IV Push daily  traZODone 50 milliGRAM(s) Oral daily    MEDICATIONS  (PRN):  acetaminophen     Tablet .. 650 milliGRAM(s) Oral every 6 hours PRN Temp greater or equal to 38C (100.4F), Mild Pain (1 - 3)  aluminum hydroxide/magnesium hydroxide/simethicone Suspension 30 milliLiter(s) Oral every 4 hours PRN Dyspepsia  melatonin 3 milliGRAM(s) Oral at bedtime PRN Insomnia  ondansetron Injectable 4 milliGRAM(s) IV Push every 8 hours PRN Nausea and/or Vomiting    No Known Allergies    T(C): 36.8 (11-23-23 @ 08:49), Max: 36.9 (11-22-23 @ 21:15)  HR: 73 (11-23-23 @ 08:49) (72 - 87)  BP: 103/62 (11-23-23 @ 08:49) (97/58 - 133/61)  RR: 18 (11-23-23 @ 08:49) (17 - 18)  SpO2: 97% (11-23-23 @ 08:49) (96% - 97%)    I&O's Summary    22 Nov 2023 07:01  -  23 Nov 2023 07:00  --------------------------------------------------------  IN: 0 mL / OUT: 300 mL / NET: -300 mL      PHYSICAL EXAM:    Constitutional: No acute distress  Neuro: Awake alert  HEENT: anicteric sclerae  CV: regular rate, regular rhythm  Pulm/chest: lung sounds diminished bilaterally, no accessory muscle use noted  Abd: soft, nontender, nondistended +bowel sounds. No rigidity, rebound tenderness, or guarding  Ext: no edema  Skin: warm, no jaundice   Psych: calm, cooperative        LABS:               8.1    5.16  )-----------( 243      ( 11-23 @ 06:31 )             24.0                7.9    7.41  )-----------( 233      ( 11-22 @ 06:45 )             24.0                7.7    7.24  )-----------( 295      ( 11-21 @ 23:15 )             23.1       11-23    138  |  108  |  10.6  ----------------------------<  107<H>  3.3<L>   |  22.0  |  0.59    Ca    7.8<L>      23 Nov 2023 06:31  Phos  2.1     11-23  Mg     2.0     11-23    TPro  5.5<L>  /  Alb  3.1<L>  /  TBili  <0.2<L>  /  DBili  x   /  AST  16  /  ALT  13  /  AlkPhos  61  11-21    LIVER FUNCTIONS - ( 21 Nov 2023 23:15 )  Alb: 3.1 g/dL / Pro: 5.5 g/dL / ALK PHOS: 61 U/L / ALT: 13 U/L / AST: 16 U/L / GGT: x               PT/INR - ( 23 Nov 2023 06:31 )   PT: 14.4 sec;   INR: 1.31 ratio         PTT - ( 23 Nov 2023 06:31 )  PTT:30.1 sec  % Saturation, Iron: 15 % (11-22-23 @ 17:20)  Iron Total: 34 ug/dL *L* (11-22-23 @ 17:20)  Iron - Total Binding Capacity.: 229 ug/dL (11-22-23 @ 17:20)  Ferritin: 94 ng/mL (11-22-23 @ 10:02)  Iron - Total Binding Capacity.: 212 ug/dL *L* (11-22-23 @ 10:02)  % Saturation, Iron: 23 % (11-22-23 @ 10:02)  Iron Total: 48 ug/dL (11-22-23 @ 10:02)

## 2023-11-23 NOTE — PROGRESS NOTE ADULT - ASSESSMENT
83 year old female with a history of CVA on coumadin who presents with diarrhea rectal bleeding in the setting of supratherapeutic INR     Rectal Bleeding  Diarrhea   Supratherapeutic INR 15 on admission, received K centra and Vitamin K. INR 1.3 this morning.   Stool studies negative for C diff and GI PCR. Stool culture NGTD.   Pending stool studies ova and parasites, fecal calprotectin and fecal elastase.  TSH normal   Celiac serologies pending report   Trend CBC, transfuse as needed.   Monitor stool count, avoid dehydration, monitor renal function, electrolytes. Replete electrolytes as needed.   Continue Protonix daily for mucosal protection   Please obtain [facility] records  Will recommend colonoscopy inpatient prior to resume anticoagulation. D/w the patient and she deferred any endoscopic procedures at this time. Will discuss with the family and primary team.

## 2023-11-23 NOTE — PROGRESS NOTE ADULT - ASSESSMENT
83 YOF w/ PMHx of dementia, AAOX1 - self, CVA S/P MOHAN X 3 months, presents for evaluation of rectal bleeding and diarrhea.     #Supratherapeutic INR   -on warfarin for unknown reasons   -possible GIB except recent CVA?  -LFTs wnl, afebrile, no leukocytosis  -hemoglobin 8.1, hematocrit 24, platelet count 243  -PT >200, PTT 86, INR >15, S/P phytonadione 5mg po X 1, will f/u repeat INR  -potassium 3.3, S/P 40mEq potassium X 1  -S/P type and screen in ER  -will hold coumadin for now  -GI recs: Correct INR, send c.diff, GI PCR, Stool culture, ova and parasites, fecal calprotectin and fecal elastase, check TSH and celiac serologies to evaluate chronic diarrhea  -Advance to clear liquid diet  -Obtain [facility] records   -Correct lytes   -No plan for endoscopic intervention at this time  -will place on fall precaution  -will start protonix 40mg IV QD    #CVA    #Dementia  -c/w trazodone 50mg QD    DVT PPx  -B/L LE SCDs    *patient does not know her medications, completed med rec w/ insight from [facility]   83 YOF w/ PMHx of dementia, AAOX1 - self, CVA S/P MOHAN X 3 months, presents for evaluation of rectal bleeding and diarrhea.     #Supratherapeutic INR   -on warfarin for unknown reasons, hold for now   -possible GIB except recent CVA?  -LFTs wnl, afebrile, no leukocytosis  -hemoglobin 8.1, hematocrit 24, platelet count 243  -PT >200, PTT 86, INR >15, S/P phytonadione 5mg po X 1  - potassium 3.3, S/P 40mEq potassium X 1  - received K centra and Vitamin K. INR 1.3 this morning.   - Stool studies negative for C diff and GI PCR. Stool culture NGTD.   - Pending stool studies ova and parasites, fecal calprotectin and fecal elastase.  - TSH normal   -Celiac serologies pending report   -Trend CBC, transfuse as needed.   - Monitor stool count, avoid dehydration, monitor renal function, electrolytes. Replete electrolytes as needed.   -Continue Protonix daily for mucosal protection   -Obtain [facility] records  -Advance to clear liquid diet  -Correct lytes   -Fall precautions  -Protonix 40mg IV QD  --Will recommend colonoscopy inpatient prior to resume anticoagulation. D/w the patient and she deferred any endoscopic procedures at this time    #CVA    #Dementia  -c/w trazodone 50mg QD    DVT PPx  -B/L LE SCDs    *patient does not know her medications, completed med rec w/ insight from [facility]   83 YOF w/ PMHx of dementia, AAOX1 - self, CVA S/P MOHAN X 3 months, presents for evaluation of rectal bleeding and diarrhea.     #Supratherapeutic INR   -on warfarin for unknown reasons, hold for now   -possible GIB except recent CVA?  -LFTs wnl, afebrile, no leukocytosis  -hemoglobin 8.1, hematocrit 24, platelet count 243  -PT >200, PTT 86, INR >15, S/P phytonadione 5mg po X 1  - potassium 3.3, S/P 40mEq potassium X 1  - received K centra and Vitamin K. INR 1.3 this morning  -GI recs appreciated, as follows:  - Stool studies negative for C diff and GI PCR. Stool culture NGTD.   - Pending stool studies ova and parasites, fecal calprotectin and fecal elastase.  - TSH normal   -Celiac serologies pending report   -Trend CBC, transfuse as needed.   - Monitor stool count, avoid dehydration, monitor renal function, electrolytes. Replete electrolytes as needed.   -Continue Protonix daily for mucosal protection   -Obtain [facility] records  -Advance to clear liquid diet  -Correct lytes   -Fall precautions  -Protonix 40mg IV QD  -11/23: Given Venofer 200 mg  -GI recommends colonoscopy inpatient prior to resume anticoagulation. D/w the patient and she deferred any endoscopic procedures at this time    #CVA    #Dementia  -c/w trazodone 50mg QD    DVT PPx  -B/L LE SCDs    *patient does not know her medications, completed med rec w/ insight from [facility]

## 2023-11-24 LAB
CULTURE RESULTS: SIGNIFICANT CHANGE UP
CULTURE RESULTS: SIGNIFICANT CHANGE UP
SPECIMEN SOURCE: SIGNIFICANT CHANGE UP
SPECIMEN SOURCE: SIGNIFICANT CHANGE UP

## 2023-11-24 PROCEDURE — 99232 SBSQ HOSP IP/OBS MODERATE 35: CPT

## 2023-11-24 RX ORDER — OLANZAPINE 15 MG/1
2.5 TABLET, FILM COATED ORAL ONCE
Refills: 0 | Status: COMPLETED | OUTPATIENT
Start: 2023-11-24 | End: 2023-11-24

## 2023-11-24 RX ORDER — QUETIAPINE FUMARATE 200 MG/1
25 TABLET, FILM COATED ORAL AT BEDTIME
Refills: 0 | Status: DISCONTINUED | OUTPATIENT
Start: 2023-11-24 | End: 2023-11-25

## 2023-11-24 RX ORDER — IRON SUCROSE 20 MG/ML
200 INJECTION, SOLUTION INTRAVENOUS EVERY 24 HOURS
Refills: 0 | Status: COMPLETED | OUTPATIENT
Start: 2023-11-24 | End: 2023-11-28

## 2023-11-24 RX ORDER — OLANZAPINE 15 MG/1
2.5 TABLET, FILM COATED ORAL EVERY 6 HOURS
Refills: 0 | Status: DISCONTINUED | OUTPATIENT
Start: 2023-11-24 | End: 2023-11-28

## 2023-11-24 RX ADMIN — Medication 650 MILLIGRAM(S): at 09:13

## 2023-11-24 RX ADMIN — QUETIAPINE FUMARATE 25 MILLIGRAM(S): 200 TABLET, FILM COATED ORAL at 22:06

## 2023-11-24 RX ADMIN — Medication 650 MILLIGRAM(S): at 23:00

## 2023-11-24 RX ADMIN — PANTOPRAZOLE SODIUM 40 MILLIGRAM(S): 20 TABLET, DELAYED RELEASE ORAL at 13:22

## 2023-11-24 RX ADMIN — MAGNESIUM OXIDE 400 MG ORAL TABLET 400 MILLIGRAM(S): 241.3 TABLET ORAL at 09:14

## 2023-11-24 RX ADMIN — Medication 3 MILLIGRAM(S): at 22:07

## 2023-11-24 RX ADMIN — IRON SUCROSE 110 MILLIGRAM(S): 20 INJECTION, SOLUTION INTRAVENOUS at 13:04

## 2023-11-24 RX ADMIN — Medication 650 MILLIGRAM(S): at 22:06

## 2023-11-24 RX ADMIN — OLANZAPINE 2.5 MILLIGRAM(S): 15 TABLET, FILM COATED ORAL at 17:56

## 2023-11-24 RX ADMIN — OLANZAPINE 2.5 MILLIGRAM(S): 15 TABLET, FILM COATED ORAL at 03:59

## 2023-11-24 RX ADMIN — ESCITALOPRAM OXALATE 10 MILLIGRAM(S): 10 TABLET, FILM COATED ORAL at 12:12

## 2023-11-24 RX ADMIN — Medication 650 MILLIGRAM(S): at 11:07

## 2023-11-24 NOTE — DIETITIAN INITIAL EVALUATION ADULT - OTHER INFO
83 year old female w/ PMHx of dementia, AAOX1 - self, CVA S/P MOHAN X 3 months, presents for evaluation of rectal bleeding and diarrhea.  Patient is unable to provide any history and family is not available.  As per EMR, diarrhea X 2 months, and 2 weeks of BRBPR.  She was started on warfarin for unclear reasons 3 months prior while admitted for CVA at [facility], however has not had any follow up bloodwork since.  +lethargy.  +intermittent RLQ pain.  As pe ED signout stool is brown with signs of blood around vault and noted hemorrhoids. As per nursing no BM while in ER, and no blood noted as well.

## 2023-11-24 NOTE — DIETITIAN INITIAL EVALUATION ADULT - WEIGHT FOR BMI (LBS)
110 Niacinamide Pregnancy And Lactation Text: These medications are considered safe during pregnancy.

## 2023-11-24 NOTE — DIETITIAN INITIAL EVALUATION ADULT - PERTINENT LABORATORY DATA
11-23    139  |  109<H>  |  9.9  ----------------------------<  97  3.4<L>   |  20.0<L>  |  0.63    Ca    7.7<L>      23 Nov 2023 10:55  Phos  2.1     11-23  Mg     1.8     11-23    TPro  5.0<L>  /  Alb  2.6<L>  /  TBili  0.2<L>  /  DBili  x   /  AST  16  /  ALT  12  /  AlkPhos  58  11-23

## 2023-11-24 NOTE — DIETITIAN INITIAL EVALUATION ADULT - ADD RECOMMEND
When medically feasible advance as tolerated to low fiber/residue; Add Ensure HP/Enlive BID; Rx MVI daily, continue vit B12 supplementation; Encourage HBV protein sources; Obtain/provide food preferences daily to inc PO

## 2023-11-24 NOTE — DIETITIAN INITIAL EVALUATION ADULT - ORAL INTAKE PTA/DIET HISTORY
Pt A&Ox1, question accuracy of information obtained during interview. Pt states UBW ~110lbs PTA, states having lost weight though unclear amount/time frame. RD bedscale weight ~106lbs, visually appears accurate. Pt requesting items not permitted on CLD, unclear if Pt is understanding of the diet ordered, education nota appropriate at this time. RD to remain available.

## 2023-11-24 NOTE — PROGRESS NOTE ADULT - ASSESSMENT
This is a 83 year old female w/ PMHx of dementia, AAOX1 - self, CVA S/P KOFI X 3 months, presents for evaluation of rectal bleeding and diarrhea. Patient admitted to medicine and seen by GI and is s/p 1 unit prbc and scope deferred and course complicated by derlium.     .Supratherapeutic INR on warfarin  - s/p kcentra  - s/p 1unit prbc    .CVA  pt consult    Dementia/ delirium  -will continue trazodone 50mg QD  - seroquel qhs    iron def anemia - iv iron   agree to defer scope    vit b12 def- cyanocoblamin    pt consult  dc in 1-2 days likely needs kofi

## 2023-11-24 NOTE — DIETITIAN NUTRITION RISK NOTIFICATION - ADDITIONAL COMMENTS/DIETITIAN RECOMMENDATIONS
1) Continue low fiber/residue diet as tolerated  2) Add Ensure HP/Enlive BID  3) Rx MVI daily, continue vit B12 supplementation  4) Encourage HBV protein sources  5) Obtain/provide food preferences daily to inc PO  6) Monitor weights daily for trend/accuracy

## 2023-11-24 NOTE — DIETITIAN INITIAL EVALUATION ADULT - PERTINENT MEDS FT
MEDICATIONS  (STANDING):  cyanocobalamin 1000 MICROGram(s) Oral daily  escitalopram 10 milliGRAM(s) Oral daily  iron sucrose IVPB 200 milliGRAM(s) IV Intermittent every 24 hours  pantoprazole  Injectable 40 milliGRAM(s) IV Push daily  QUEtiapine 25 milliGRAM(s) Oral at bedtime  traZODone 50 milliGRAM(s) Oral daily    MEDICATIONS  (PRN):  acetaminophen     Tablet .. 650 milliGRAM(s) Oral every 6 hours PRN Temp greater or equal to 38C (100.4F), Mild Pain (1 - 3)  aluminum hydroxide/magnesium hydroxide/simethicone Suspension 30 milliLiter(s) Oral every 4 hours PRN Dyspepsia  melatonin 3 milliGRAM(s) Oral at bedtime PRN Insomnia  OLANZapine Injectable 2.5 milliGRAM(s) IntraMuscular every 6 hours PRN for agitation  ondansetron Injectable 4 milliGRAM(s) IV Push every 8 hours PRN Nausea and/or Vomiting

## 2023-11-24 NOTE — DIETITIAN INITIAL EVALUATION ADULT - ETIOLOGY
related to inability to meet sufficient protein-energy needs in setting of persistent diarrhea 2/2 GIB, impaired skin integrity

## 2023-11-24 NOTE — PHYSICAL THERAPY INITIAL EVALUATION ADULT - GAIT DISTANCE, PT EVAL
12 feet side stepping at edge of bed due to environmental constraint: pt with IV meds hung on fixed pole.

## 2023-11-24 NOTE — PHYSICAL THERAPY INITIAL EVALUATION ADULT - DIAGNOSIS, PT EVAL
Pt presents with functional limitations in transfers and ambulation due to decreased balance, decreased safety awareness, and mild impulsivity.

## 2023-11-24 NOTE — PROGRESS NOTE ADULT - SUBJECTIVE AND OBJECTIVE BOX
Chief Complaint:  Patient is a 83y old  Female who presents with a chief complaint of rectal bleeding (23 Nov 2023 13:56)      HPI/ 24 hr events: Patient seen and examined at bedside. Overnight she had confusion. This morning she thinks she is in her home. HPI/ROS limited secondary to mental status. Per bedside RN patient had a BM overnight and this morning that were loose, there was no further episodes of bleeding. Her vitals are overall stable. There are no labs available to review from this morning.       MEDICATIONS:   MEDICATIONS  (STANDING):  cyanocobalamin 1000 MICROGram(s) Oral daily  cyanocobalamin Injectable 1000 MICROGram(s) IntraMuscular daily  escitalopram 10 milliGRAM(s) Oral daily  iron sucrose IVPB 200 milliGRAM(s) IV Intermittent every 24 hours  lactated ringers. 1000 milliLiter(s) (50 mL/Hr) IV Continuous <Continuous>  magnesium oxide 400 milliGRAM(s) Oral two times a day with meals  pantoprazole  Injectable 40 milliGRAM(s) IV Push daily  QUEtiapine 25 milliGRAM(s) Oral at bedtime  traZODone 50 milliGRAM(s) Oral daily    MEDICATIONS  (PRN):  acetaminophen     Tablet .. 650 milliGRAM(s) Oral every 6 hours PRN Temp greater or equal to 38C (100.4F), Mild Pain (1 - 3)  aluminum hydroxide/magnesium hydroxide/simethicone Suspension 30 milliLiter(s) Oral every 4 hours PRN Dyspepsia  melatonin 3 milliGRAM(s) Oral at bedtime PRN Insomnia  OLANZapine Injectable 2.5 milliGRAM(s) IntraMuscular every 6 hours PRN for agitation  ondansetron Injectable 4 milliGRAM(s) IV Push every 8 hours PRN Nausea and/or Vomiting      Packed Red Cells Order:  1 Unit  Indication: Anemia due to Active Hemorrhage - Medical Conditions e.  Infuse Unit : 3 Hours (11-22-23 @ 10:20)    phytonadione   Solution: [Ordered as VITAMIN K   Solution]  5 milliGRAM(s), Oral, Once, Stop After 1 Doses (11-22-23 @ 01:06)        DIET:  Diet, Clear Liquid (11-23-23 @ 18:41) [Active]          ALLERGIES:   Allergies    No Known Allergies    Intolerances        VITAL SIGNS:   Vital Signs Last 24 Hrs  T(C): 36.6 (23 Nov 2023 20:45), Max: 37 (23 Nov 2023 17:15)  T(F): 97.9 (23 Nov 2023 20:45), Max: 98.6 (23 Nov 2023 17:15)  HR: 75 (23 Nov 2023 20:45) (72 - 75)  BP: 115/61 (23 Nov 2023 20:45) (115/61 - 118/65)  BP(mean): --  RR: 17 (23 Nov 2023 20:45) (17 - 18)  SpO2: 94% (23 Nov 2023 20:45) (94% - 97%)    Parameters below as of 23 Nov 2023 20:45  Patient On (Oxygen Delivery Method): room air      I&O's Summary      PHYSICAL EXAM:   GENERAL:  No acute distress  HEENT:  NC/AT, conjunctiva clear, sclera anicteric  CHEST:  No increased effort  HEART:  Regular rate  ABDOMEN:  Soft, non-tender, non-distended, normoactive bowel sounds, no rebound or guarding  EXTREMITIES: No edema  SKIN:  Warm, dry  NEURO:  Agitated, in wrist restraints     LABS:                        8.9    6.16  )-----------( 261      ( 23 Nov 2023 10:55 )             26.3     Hemoglobin: 8.9 g/dL (11-23-23 @ 10:55)  Hemoglobin: 8.1 g/dL (11-23-23 @ 06:31)  Hemoglobin: 7.9 g/dL (11-22-23 @ 06:45)  Hemoglobin: 7.7 g/dL (11-21-23 @ 23:15)    11-23    139  |  109<H>  |  9.9  ----------------------------<  97  3.4<L>   |  20.0<L>  |  0.63    Ca    7.7<L>      23 Nov 2023 10:55  Phos  2.1     11-23  Mg     1.8     11-23    TPro  5.0<L>  /  Alb  2.6<L>  /  TBili  0.2<L>  /  DBili  x   /  AST  16  /  ALT  12  /  AlkPhos  58  11-23    LIVER FUNCTIONS - ( 23 Nov 2023 10:55 )  Alb: 2.6 g/dL / Pro: 5.0 g/dL / ALK PHOS: 58 U/L / ALT: 12 U/L / AST: 16 U/L / GGT: x             PT/INR - ( 23 Nov 2023 06:31 )   PT: 14.4 sec;   INR: 1.31 ratio         PTT - ( 23 Nov 2023 06:31 )  PTT:30.1 sec    Culture - Stool (collected 22 Nov 2023 08:51)  Source: .Stool Feces  Preliminary Report (23 Nov 2023 21:24):    No enteric pathogens to date: Final culture pending      GI PCR Panel: [facility] (11-22-23 @ 08:51)                      RADIOLOGY & ADDITIONAL STUDIES:                 Chief Complaint:  Patient is a 83y old  Female who presents with a chief complaint of rectal bleeding (23 Nov 2023 13:56)      HPI/ 24 hr events: Patient seen and examined at bedside. Overnight she had confusion. This morning she thinks she is in her home. HPI/ROS limited secondary to mental status. Per bedside RN patient had a BM overnight and this morning that were loose, there was no further episodes of bleeding. Her vitals are overall stable. There are no labs available to review from this morning. Reviewed [facility] records, C diff from 8/2023 was negative.       MEDICATIONS:   MEDICATIONS  (STANDING):  cyanocobalamin 1000 MICROGram(s) Oral daily  cyanocobalamin Injectable 1000 MICROGram(s) IntraMuscular daily  escitalopram 10 milliGRAM(s) Oral daily  iron sucrose IVPB 200 milliGRAM(s) IV Intermittent every 24 hours  lactated ringers. 1000 milliLiter(s) (50 mL/Hr) IV Continuous <Continuous>  magnesium oxide 400 milliGRAM(s) Oral two times a day with meals  pantoprazole  Injectable 40 milliGRAM(s) IV Push daily  QUEtiapine 25 milliGRAM(s) Oral at bedtime  traZODone 50 milliGRAM(s) Oral daily    MEDICATIONS  (PRN):  acetaminophen     Tablet .. 650 milliGRAM(s) Oral every 6 hours PRN Temp greater or equal to 38C (100.4F), Mild Pain (1 - 3)  aluminum hydroxide/magnesium hydroxide/simethicone Suspension 30 milliLiter(s) Oral every 4 hours PRN Dyspepsia  melatonin 3 milliGRAM(s) Oral at bedtime PRN Insomnia  OLANZapine Injectable 2.5 milliGRAM(s) IntraMuscular every 6 hours PRN for agitation  ondansetron Injectable 4 milliGRAM(s) IV Push every 8 hours PRN Nausea and/or Vomiting      Packed Red Cells Order:  1 Unit  Indication: Anemia due to Active Hemorrhage - Medical Conditions e.  Infuse Unit : 3 Hours (11-22-23 @ 10:20)    phytonadione   Solution: [Ordered as VITAMIN K   Solution]  5 milliGRAM(s), Oral, Once, Stop After 1 Doses (11-22-23 @ 01:06)        DIET:  Diet, Clear Liquid (11-23-23 @ 18:41) [Active]          ALLERGIES:   Allergies    No Known Allergies    Intolerances        VITAL SIGNS:   Vital Signs Last 24 Hrs  T(C): 36.6 (23 Nov 2023 20:45), Max: 37 (23 Nov 2023 17:15)  T(F): 97.9 (23 Nov 2023 20:45), Max: 98.6 (23 Nov 2023 17:15)  HR: 75 (23 Nov 2023 20:45) (72 - 75)  BP: 115/61 (23 Nov 2023 20:45) (115/61 - 118/65)  BP(mean): --  RR: 17 (23 Nov 2023 20:45) (17 - 18)  SpO2: 94% (23 Nov 2023 20:45) (94% - 97%)    Parameters below as of 23 Nov 2023 20:45  Patient On (Oxygen Delivery Method): room air      I&O's Summary      PHYSICAL EXAM:   GENERAL:  No acute distress  HEENT:  NC/AT, conjunctiva clear, sclera anicteric  CHEST:  No increased effort  HEART:  Regular rate  ABDOMEN:  Soft, non-tender, non-distended, normoactive bowel sounds, no rebound or guarding  EXTREMITIES: No edema  SKIN:  Warm, dry  NEURO:  Agitated, in wrist restraints     LABS:                        8.9    6.16  )-----------( 261      ( 23 Nov 2023 10:55 )             26.3     Hemoglobin: 8.9 g/dL (11-23-23 @ 10:55)  Hemoglobin: 8.1 g/dL (11-23-23 @ 06:31)  Hemoglobin: 7.9 g/dL (11-22-23 @ 06:45)  Hemoglobin: 7.7 g/dL (11-21-23 @ 23:15)    11-23    139  |  109<H>  |  9.9  ----------------------------<  97  3.4<L>   |  20.0<L>  |  0.63    Ca    7.7<L>      23 Nov 2023 10:55  Phos  2.1     11-23  Mg     1.8     11-23    TPro  5.0<L>  /  Alb  2.6<L>  /  TBili  0.2<L>  /  DBili  x   /  AST  16  /  ALT  12  /  AlkPhos  58  11-23    LIVER FUNCTIONS - ( 23 Nov 2023 10:55 )  Alb: 2.6 g/dL / Pro: 5.0 g/dL / ALK PHOS: 58 U/L / ALT: 12 U/L / AST: 16 U/L / GGT: x             PT/INR - ( 23 Nov 2023 06:31 )   PT: 14.4 sec;   INR: 1.31 ratio         PTT - ( 23 Nov 2023 06:31 )  PTT:30.1 sec    Culture - Stool (collected 22 Nov 2023 08:51)  Source: .Stool Feces  Preliminary Report (23 Nov 2023 21:24):    No enteric pathogens to date: Final culture pending      GI PCR Panel: [facility] (11-22-23 @ 08:51)                      RADIOLOGY & ADDITIONAL STUDIES:         Gastroenterology Progress Note  11/24/2023     Chief Complaint:  Patient is a 83y old  Female who presents with a chief complaint of rectal bleeding (23 Nov 2023 13:56)    HPI/ 24 hr events: Patient seen and examined at bedside. Overnight she had confusion. This morning she thinks she is in her home. HPI/ROS limited secondary to mental status. Per bedside RN patient had a BM overnight and this morning that were loose, there was no further episodes of bleeding. Her vitals are overall stable. There are no labs available to review from this morning. Reviewed [facility] records, C diff from 8/2023 was negative.     MEDICATIONS:   MEDICATIONS  (STANDING):  cyanocobalamin 1000 MICROGram(s) Oral daily  cyanocobalamin Injectable 1000 MICROGram(s) IntraMuscular daily  escitalopram 10 milliGRAM(s) Oral daily  iron sucrose IVPB 200 milliGRAM(s) IV Intermittent every 24 hours  lactated ringers. 1000 milliLiter(s) (50 mL/Hr) IV Continuous <Continuous>  magnesium oxide 400 milliGRAM(s) Oral two times a day with meals  pantoprazole  Injectable 40 milliGRAM(s) IV Push daily  QUEtiapine 25 milliGRAM(s) Oral at bedtime  traZODone 50 milliGRAM(s) Oral daily    MEDICATIONS  (PRN):  acetaminophen     Tablet .. 650 milliGRAM(s) Oral every 6 hours PRN Temp greater or equal to 38C (100.4F), Mild Pain (1 - 3)  aluminum hydroxide/magnesium hydroxide/simethicone Suspension 30 milliLiter(s) Oral every 4 hours PRN Dyspepsia  melatonin 3 milliGRAM(s) Oral at bedtime PRN Insomnia  OLANZapine Injectable 2.5 milliGRAM(s) IntraMuscular every 6 hours PRN for agitation  ondansetron Injectable 4 milliGRAM(s) IV Push every 8 hours PRN Nausea and/or Vomiting    Packed Red Cells Order:  1 Unit  Indication: Anemia due to Active Hemorrhage - Medical Conditions e.  Infuse Unit : 3 Hours (11-22-23 @ 10:20)    phytonadione   Solution: [Ordered as VITAMIN K   Solution]  5 milliGRAM(s), Oral, Once, Stop After 1 Doses (11-22-23 @ 01:06)    DIET:  Diet, Clear Liquid (11-23-23 @ 18:41) [Active]    ALLERGIES:   Allergies  No Known Allergies    Intolerances    VITAL SIGNS:   Vital Signs Last 24 Hrs  T(C): 36.6 (23 Nov 2023 20:45), Max: 37 (23 Nov 2023 17:15)  T(F): 97.9 (23 Nov 2023 20:45), Max: 98.6 (23 Nov 2023 17:15)  HR: 75 (23 Nov 2023 20:45) (72 - 75)  BP: 115/61 (23 Nov 2023 20:45) (115/61 - 118/65)  BP(mean): --  RR: 17 (23 Nov 2023 20:45) (17 - 18)  SpO2: 94% (23 Nov 2023 20:45) (94% - 97%)    Parameters below as of 23 Nov 2023 20:45  Patient On (Oxygen Delivery Method): room air    I&O's Summary    PHYSICAL EXAM:   GENERAL:  No acute distress  HEENT:  NC/AT, conjunctiva clear, sclera anicteric  CHEST:  No increased effort  HEART:  Regular rate  ABDOMEN:  Soft, non-tender, non-distended, normoactive bowel sounds, no rebound or guarding  EXTREMITIES: No edema  SKIN:  Warm, dry  NEURO:  Agitated, in wrist restraints     LABS:             8.9    6.16  )-----------( 261      ( 23 Nov 2023 10:55 )             26.3     Hemoglobin: 8.9 g/dL (11-23-23 @ 10:55)  Hemoglobin: 8.1 g/dL (11-23-23 @ 06:31)  Hemoglobin: 7.9 g/dL (11-22-23 @ 06:45)  Hemoglobin: 7.7 g/dL (11-21-23 @ 23:15)    11-23    139  |  109<H>  |  9.9  ----------------------------<  97  3.4<L>   |  20.0<L>  |  0.63    Ca    7.7<L>      23 Nov 2023 10:55  Phos  2.1     11-23  Mg     1.8     11-23    TPro  5.0<L>  /  Alb  2.6<L>  /  TBili  0.2<L>  /  DBili  x   /  AST  16  /  ALT  12  /  AlkPhos  58  11-23    LIVER FUNCTIONS - ( 23 Nov 2023 10:55 )  Alb: 2.6 g/dL / Pro: 5.0 g/dL / ALK PHOS: 58 U/L / ALT: 12 U/L / AST: 16 U/L / GGT: x             PT/INR - ( 23 Nov 2023 06:31 )   PT: 14.4 sec;   INR: 1.31 ratio         PTT - ( 23 Nov 2023 06:31 )  PTT:30.1 sec    Culture - Stool (collected 22 Nov 2023 08:51)  Source: .Stool Feces  Preliminary Report (23 Nov 2023 21:24):  No enteric pathogens to date: Final culture pending    GI PCR Panel: [facility] (11-22-23 @ 08:51)    RADIOLOGY & ADDITIONAL STUDIES:

## 2023-11-24 NOTE — PROGRESS NOTE ADULT - SUBJECTIVE AND OBJECTIVE BOX
THI JONES    [id number]    83y      Female    INTERVAL HPI/OVERNIGHT EVENTS:  patient being seen for delirium and gi bleed.     overnight, patient grew agitated required medications and restraints    patient seen at bedside and states feeling better and is more alert but still mildly confused    REVIEW OF SYSTEMS:    CONSTITUTIONAL: No fever, weight loss, or fatigue  RESPIRATORY: No cough, wheezing, hemoptysis; No shortness of breath  CARDIOVASCULAR: No chest pain, palpitations  GASTROINTESTINAL: No abdominal or epigastric pain. No nausea, vomiting  NEUROLOGICAL: No headaches, memory loss, loss of strength.  MISCELLANEOUS:      Vital Signs Last 24 Hrs  T(C): 36.6 (23 Nov 2023 20:45), Max: 37 (23 Nov 2023 17:15)  T(F): 97.9 (23 Nov 2023 20:45), Max: 98.6 (23 Nov 2023 17:15)  HR: 88 (24 Nov 2023 09:23) (72 - 88)  BP: 115/61 (23 Nov 2023 20:45) (115/61 - 118/65)  BP(mean): --  RR: 17 (23 Nov 2023 20:45) (17 - 18)  SpO2: 94% (23 Nov 2023 20:45) (94% - 97%)    Parameters below as of 23 Nov 2023 20:45  Patient On (Oxygen Delivery Method): room air        PHYSICAL EXAM:    GENERAL: NAD, pleasantly confused, follows commands  HEENT: PERRL, +EOMI  NECK: soft, Supple, No JVD,   CHEST/LUNG: Clear to auscultation bilaterally; No wheezing  HEART: S1S2+, Regular rate and rhythm; No murmurs, rubs, or gallops  ABDOMEN: Soft, Nontender, Nondistended; Bowel sounds present  EXTREMITIES:  2+ Peripheral Pulses, restraints  SKIN: No rashes or lesions  NEURO: AAOX2,       LABS:                        8.9    6.16  )-----------( 261      ( 23 Nov 2023 10:55 )             26.3     11-23    139  |  109<H>  |  9.9  ----------------------------<  97  3.4<L>   |  20.0<L>  |  0.63    Ca    7.7<L>      23 Nov 2023 10:55  Phos  2.1     11-23  Mg     1.8     11-23    TPro  5.0<L>  /  Alb  2.6<L>  /  TBili  0.2<L>  /  DBili  x   /  AST  16  /  ALT  12  /  AlkPhos  58  11-23    PT/INR - ( 23 Nov 2023 06:31 )   PT: 14.4 sec;   INR: 1.31 ratio         PTT - ( 23 Nov 2023 06:31 )  PTT:30.1 sec  Urinalysis Basic - ( 23 Nov 2023 10:55 )    Color: x / Appearance: x / SG: x / pH: x  Gluc: 97 mg/dL / Ketone: x  / Bili: x / Urobili: x   Blood: x / Protein: x / Nitrite: x   Leuk Esterase: x / RBC: x / WBC x   Sq Epi: x / Non Sq Epi: x / Bacteria: x          MEDICATIONS  (STANDING):  cyanocobalamin 1000 MICROGram(s) Oral daily  escitalopram 10 milliGRAM(s) Oral daily  iron sucrose IVPB 200 milliGRAM(s) IV Intermittent every 24 hours  pantoprazole  Injectable 40 milliGRAM(s) IV Push daily  QUEtiapine 25 milliGRAM(s) Oral at bedtime  traZODone 50 milliGRAM(s) Oral daily    MEDICATIONS  (PRN):  acetaminophen     Tablet .. 650 milliGRAM(s) Oral every 6 hours PRN Temp greater or equal to 38C (100.4F), Mild Pain (1 - 3)  aluminum hydroxide/magnesium hydroxide/simethicone Suspension 30 milliLiter(s) Oral every 4 hours PRN Dyspepsia  melatonin 3 milliGRAM(s) Oral at bedtime PRN Insomnia  OLANZapine Injectable 2.5 milliGRAM(s) IntraMuscular every 6 hours PRN for agitation  ondansetron Injectable 4 milliGRAM(s) IV Push every 8 hours PRN Nausea and/or Vomiting      RADIOLOGY & ADDITIONAL TESTS:

## 2023-11-24 NOTE — CHART NOTE - NSCHARTNOTEFT_GEN_A_CORE
Time of evaluation:     Called to evaluate patient for restraints    Other interventions attempted: de-escalation, orientation check, environment modification, medication assessment    PAST MEDICAL & SURGICAL HISTORY:  Dementia  CVA (cerebrovascular accident)      MEDICATIONS  (STANDING):  cyanocobalamin 1000 MICROGram(s) Oral daily  cyanocobalamin Injectable 1000 MICROGram(s) IntraMuscular daily  escitalopram 10 milliGRAM(s) Oral daily  lactated ringers. 1000 milliLiter(s) (50 mL/Hr) IV Continuous <Continuous>  magnesium oxide 400 milliGRAM(s) Oral two times a day with meals  OLANZapine Injectable 2.5 milliGRAM(s) IntraMuscular once  pantoprazole  Injectable 40 milliGRAM(s) IV Push daily  traZODone 50 milliGRAM(s) Oral daily    MEDICATIONS  (PRN):  acetaminophen     Tablet .. 650 milliGRAM(s) Oral every 6 hours PRN Temp greater or equal to 38C (100.4F), Mild Pain (1 - 3)  aluminum hydroxide/magnesium hydroxide/simethicone Suspension 30 milliLiter(s) Oral every 4 hours PRN Dyspepsia  melatonin 3 milliGRAM(s) Oral at bedtime PRN Insomnia  ondansetron Injectable 4 milliGRAM(s) IV Push every 8 hours PRN Nausea and/or Vomiting                          8.9    6.16  )-----------( 261      ( 23 Nov 2023 10:55 )             26.3     11-23    139  |  109<H>  |  9.9  ----------------------------<  97  3.4<L>   |  20.0<L>  |  0.63    Ca    7.7<L>      23 Nov 2023 10:55  Phos  2.1     11-23  Mg     1.8     11-23    TPro  5.0<L>  /  Alb  2.6<L>  /  TBili  0.2<L>  /  DBili  x   /  AST  16  /  ALT  12  /  AlkPhos  58  11-23      Vital Signs Last 24 Hrs  T(C): 36.6 (23 Nov 2023 20:45), Max: 37 (23 Nov 2023 17:15)  T(F): 97.9 (23 Nov 2023 20:45), Max: 98.6 (23 Nov 2023 17:15)  HR: 75 (23 Nov 2023 20:45) (72 - 79)  BP: 115/61 (23 Nov 2023 20:45) (103/62 - 120/65)  BP(mean): --  RR: 17 (23 Nov 2023 20:45) (17 - 18)  SpO2: 94% (23 Nov 2023 20:45) (94% - 97%)    Parameters below as of 23 Nov 2023 20:45  Patient On (Oxygen Delivery Method): room air      [X ] I have evaluated this patient and have determined that restraints are warranted to optimize medical care. Patient was assessed for current physical and psychological risk factors as well as special needs. There are no medical conditions or limitations that would place this patient at risk while in restraints.    Type of restraint: Bilateral soft wrist restraints Called to evaluate patient for restraints. Patient agitated, jumping out of bed, yelling and attempting to take roommate's belongings.     Other interventions attempted: de-escalation, orientation check, environment modification, medication assessment    PAST MEDICAL & SURGICAL HISTORY:  Dementia  CVA (cerebrovascular accident)      MEDICATIONS  (STANDING):  cyanocobalamin 1000 MICROGram(s) Oral daily  cyanocobalamin Injectable 1000 MICROGram(s) IntraMuscular daily  escitalopram 10 milliGRAM(s) Oral daily  lactated ringers. 1000 milliLiter(s) (50 mL/Hr) IV Continuous <Continuous>  magnesium oxide 400 milliGRAM(s) Oral two times a day with meals  OLANZapine Injectable 2.5 milliGRAM(s) IntraMuscular once  pantoprazole  Injectable 40 milliGRAM(s) IV Push daily  traZODone 50 milliGRAM(s) Oral daily    MEDICATIONS  (PRN):  acetaminophen     Tablet .. 650 milliGRAM(s) Oral every 6 hours PRN Temp greater or equal to 38C (100.4F), Mild Pain (1 - 3)  aluminum hydroxide/magnesium hydroxide/simethicone Suspension 30 milliLiter(s) Oral every 4 hours PRN Dyspepsia  melatonin 3 milliGRAM(s) Oral at bedtime PRN Insomnia  ondansetron Injectable 4 milliGRAM(s) IV Push every 8 hours PRN Nausea and/or Vomiting                          8.9    6.16  )-----------( 261      ( 23 Nov 2023 10:55 )             26.3     11-23    139  |  109<H>  |  9.9  ----------------------------<  97  3.4<L>   |  20.0<L>  |  0.63    Ca    7.7<L>      23 Nov 2023 10:55  Phos  2.1     11-23  Mg     1.8     11-23    TPro  5.0<L>  /  Alb  2.6<L>  /  TBili  0.2<L>  /  DBili  x   /  AST  16  /  ALT  12  /  AlkPhos  58  11-23      Vital Signs Last 24 Hrs  T(C): 36.6 (23 Nov 2023 20:45), Max: 37 (23 Nov 2023 17:15)  T(F): 97.9 (23 Nov 2023 20:45), Max: 98.6 (23 Nov 2023 17:15)  HR: 75 (23 Nov 2023 20:45) (72 - 79)  BP: 115/61 (23 Nov 2023 20:45) (103/62 - 120/65)  BP(mean): --  RR: 17 (23 Nov 2023 20:45) (17 - 18)  SpO2: 94% (23 Nov 2023 20:45) (94% - 97%)    Parameters below as of 23 Nov 2023 20:45  Patient On (Oxygen Delivery Method): room air      [X ] I have evaluated this patient and have determined that restraints are warranted to optimize medical care. Patient was assessed for current physical and psychological risk factors as well as special needs. There are no medical conditions or limitations that would place this patient at risk while in restraints.    Type of restraint: Bilateral soft wrist restraints

## 2023-11-24 NOTE — PROVIDER CONTACT NOTE (OTHER) - SITUATION
Pt uncooperative/combative with staff with numerous failed attempts at redirecting patient back to bed.

## 2023-11-24 NOTE — PHYSICAL THERAPY INITIAL EVALUATION ADULT - ADDITIONAL COMMENTS
Pt is ox 2 to self and place ("hospital") at time of evaluation. Pt has dementia and may be poor historian. Pt reports she lives ind a PH with 2 BELLE and 2 HR, interior steps x 12 with 2 HR.  Pt reports she lives with her  and daughter.  Prior to admission at [facility] pt was in rehab and pt states she was ambulating with a RW and assistance at rehab.  Pt reports her family is unable to assist her at home due to her  is blind.

## 2023-11-24 NOTE — PROGRESS NOTE ADULT - ASSESSMENT
82 y/o F with PMHx CVA on coumadin who presents with diarrhea rectal bleeding in the setting of supratherapeutic INR     #Rectal Bleeding  #Diarrhea   Supratherapeutic INR >15 on admission, received K centra and Vitamin K.   C Diff by PCR Result: NotDete  [facility]  Stool Culture: NGTD     - Trend CBC  - Trend electrolytes, replete as needed   - Monitor for fever  - Continue supportive care  - No lactose, low fiber, low fat diet as tolerated   - Stool count  - May start Loperamide if diarrhea continues   - Outpatient follow up for consideration of EGD/colonoscopy to assess rectal bleeding and chronic diarrhea.   - Pending O&P, fecal calprotectin and elastase, celiac serologies   - Continue Protonix daily for mucosal protection   - Recommend risk/benefit assessment of resumption of anticoagulation in a demented patient who presented with supratherapeutic INR > 15   _________________________________________________________________  Assessment and recommendations are final when note is signed by the attending physician.

## 2023-11-24 NOTE — PHYSICAL THERAPY INITIAL EVALUATION ADULT - GENERAL OBSERVATIONS, REHAB EVAL
Pt received reclining in bed, bedrails x 3, bed alarm on, CBWR, IV line intact. Pt agreeable to PT session.

## 2023-11-24 NOTE — PROVIDER CONTACT NOTE (OTHER) - ASSESSMENT
Pt walking around room naked while defecating and verbally accosting staff. Pt attempted to take roomates personal belongings to wipe her feces up.

## 2023-11-24 NOTE — PROGRESS NOTE ADULT - NS ATTEND AMEND GEN_ALL_CORE FT
Ms. Fraser is a 83 year old woman with significant past medical history of CVA on warfarin who presented with rectal bleeding in the setting of supra-therapeutic INR and chronic diarrhea. Infectious work up for diarrhea negative to date. Hemoglobin remains stable. Suspect bleeding likely due to underlying elevated INR. From a GI stand point no emergent endoscopic intervention planned. If determined benefits outweigh the risks for continued anticoagulation given evidence of therapeutic misadventure (INR >15) then would restart AC and monitor closely for continued bleeding.     Rectal bleeding  Anemia   Acute on chronic diarrhea     Plan:   Agree with plan as outlined above.   Can utilize Loperamide PRN for chronic diarrhea. Follow up pending labs.   If determined that benefits outweigh the risks for continued anticoagulation given evidence of therapeutic misadventure (INR >15) then would restart AC and monitor closely for continued bleeding.   Outpatient EGD/Colonoscopy if determined medically appropriate / patient preference.   No urgent or emergent inpatient endoscopic intervention planned at this time.   Please do not hesitate to contact GI service for additional questions or concerns not already addressed.
admitted with rectal bleeding, drop in hemoglobin, supratherapeutic INR   Patient has not had further rectal bleeding.    Hemoglobin is now stable At 8.9  CBC ordered for tomorrow  I reviewed hospitalist note–patient with a history of dementia.  On trazodone. Pt  May not be a good candidate for anticoagulation.  She had supratherapeutic therapeutic INR.  Her compliance may be poor. May not be taking her meds appropriately.     May advance diet.  Patient adamantly refuses colonoscopy. However it is noted in the chart that she has dementia.  She does not want me to speak to her family regarding colonoscopy.    I spoke to Dr. Rivas regarding plan for the patient.  I told him that patient is refusing colonoscopy.

## 2023-11-24 NOTE — PHYSICAL THERAPY INITIAL EVALUATION ADULT - PERSONAL SAFETY AND JUDGMENT, REHAB EVAL
mild impulsivity, pt will initiate transfers unprompted by PT./impaired/at risk behaviors demonstrated

## 2023-11-25 LAB
BLD GP AB SCN SERPL QL: SIGNIFICANT CHANGE UP
BLD GP AB SCN SERPL QL: SIGNIFICANT CHANGE UP
GLIADIN PEPTIDE IGA SER-ACNC: 5.8 UNITS — SIGNIFICANT CHANGE UP
GLIADIN PEPTIDE IGA SER-ACNC: 5.8 UNITS — SIGNIFICANT CHANGE UP
GLIADIN PEPTIDE IGA SER-ACNC: NEGATIVE — SIGNIFICANT CHANGE UP
GLIADIN PEPTIDE IGA SER-ACNC: NEGATIVE — SIGNIFICANT CHANGE UP
GLIADIN PEPTIDE IGG SER-ACNC: <5 UNITS — SIGNIFICANT CHANGE UP
GLIADIN PEPTIDE IGG SER-ACNC: <5 UNITS — SIGNIFICANT CHANGE UP
GLIADIN PEPTIDE IGG SER-ACNC: NEGATIVE — SIGNIFICANT CHANGE UP
GLIADIN PEPTIDE IGG SER-ACNC: NEGATIVE — SIGNIFICANT CHANGE UP
INR BLD: 1.2 RATIO — HIGH (ref 0.85–1.18)
INR BLD: 1.2 RATIO — HIGH (ref 0.85–1.18)
PROTHROM AB SERPL-ACNC: 13.2 SEC — HIGH (ref 9.5–13)
PROTHROM AB SERPL-ACNC: 13.2 SEC — HIGH (ref 9.5–13)
TTG IGA SER-ACNC: <1.2 U/ML — SIGNIFICANT CHANGE UP
TTG IGA SER-ACNC: <1.2 U/ML — SIGNIFICANT CHANGE UP
TTG IGA SER-ACNC: NEGATIVE — SIGNIFICANT CHANGE UP
TTG IGA SER-ACNC: NEGATIVE — SIGNIFICANT CHANGE UP

## 2023-11-25 PROCEDURE — 99232 SBSQ HOSP IP/OBS MODERATE 35: CPT

## 2023-11-25 RX ORDER — QUETIAPINE FUMARATE 200 MG/1
50 TABLET, FILM COATED ORAL AT BEDTIME
Refills: 0 | Status: DISCONTINUED | OUTPATIENT
Start: 2023-11-25 | End: 2023-11-29

## 2023-11-25 RX ORDER — TRAZODONE HCL 50 MG
100 TABLET ORAL DAILY
Refills: 0 | Status: DISCONTINUED | OUTPATIENT
Start: 2023-11-25 | End: 2023-11-27

## 2023-11-25 RX ADMIN — PREGABALIN 1000 MICROGRAM(S): 225 CAPSULE ORAL at 12:50

## 2023-11-25 RX ADMIN — QUETIAPINE FUMARATE 50 MILLIGRAM(S): 200 TABLET, FILM COATED ORAL at 22:05

## 2023-11-25 RX ADMIN — Medication 650 MILLIGRAM(S): at 12:50

## 2023-11-25 RX ADMIN — Medication 50 MILLIGRAM(S): at 12:50

## 2023-11-25 RX ADMIN — Medication 650 MILLIGRAM(S): at 13:50

## 2023-11-25 RX ADMIN — PANTOPRAZOLE SODIUM 40 MILLIGRAM(S): 20 TABLET, DELAYED RELEASE ORAL at 12:51

## 2023-11-25 RX ADMIN — ESCITALOPRAM OXALATE 10 MILLIGRAM(S): 10 TABLET, FILM COATED ORAL at 12:50

## 2023-11-25 RX ADMIN — IRON SUCROSE 110 MILLIGRAM(S): 20 INJECTION, SOLUTION INTRAVENOUS at 12:51

## 2023-11-25 NOTE — PROGRESS NOTE ADULT - SUBJECTIVE AND OBJECTIVE BOX
Hospitalist Progress Note    Chief Complaint:  Rectal bleeding    SUBJECTIVE / OVERNIGHT EVENTS:  No events overnight, patient seen at bedside, in NAD, no complaints today. Patient denies chest pain, SOB, abd pain, N/V, fever, chills, dysuria or any other complaints. All remainder ROS negative.     MEDICATIONS  (STANDING):  cyanocobalamin 1000 MICROGram(s) Oral daily  escitalopram 10 milliGRAM(s) Oral daily  iron sucrose IVPB 200 milliGRAM(s) IV Intermittent every 24 hours  pantoprazole  Injectable 40 milliGRAM(s) IV Push daily  QUEtiapine 25 milliGRAM(s) Oral at bedtime  traZODone 50 milliGRAM(s) Oral daily    MEDICATIONS  (PRN):  acetaminophen     Tablet .. 650 milliGRAM(s) Oral every 6 hours PRN Temp greater or equal to 38C (100.4F), Mild Pain (1 - 3)  aluminum hydroxide/magnesium hydroxide/simethicone Suspension 30 milliLiter(s) Oral every 4 hours PRN Dyspepsia  melatonin 3 milliGRAM(s) Oral at bedtime PRN Insomnia  OLANZapine Injectable 2.5 milliGRAM(s) IntraMuscular every 6 hours PRN for agitation  ondansetron Injectable 4 milliGRAM(s) IV Push every 8 hours PRN Nausea and/or Vomiting        I&O's Summary    24 Nov 2023 07:01  -  25 Nov 2023 07:00  --------------------------------------------------------  IN: 120 mL / OUT: 0 mL / NET: 120 mL        PHYSICAL EXAM:  Vital Signs Last 24 Hrs  T(C): 36.7 (25 Nov 2023 10:25), Max: 36.7 (25 Nov 2023 10:25)  T(F): 98.1 (25 Nov 2023 10:25), Max: 98.1 (25 Nov 2023 10:25)  HR: 74 (25 Nov 2023 10:25) (74 - 86)  BP: 107/58 (25 Nov 2023 10:25) (107/58 - 144/74)  BP(mean): --  RR: 18 (25 Nov 2023 10:25) (18 - 18)  SpO2: 94% (25 Nov 2023 10:25) (92% - 96%)    Parameters below as of 25 Nov 2023 10:25  Patient On (Oxygen Delivery Method): room air            GENERAL: NAD, pleasant, AAOx3, has constructive conversation  HEENT: PERRL, +EOMI  NECK: soft, Supple, No JVD,   CHEST/LUNG: Clear to auscultation bilaterally; No wheezing  HEART: S1S2+, Regular rate and rhythm; No murmurs, rubs, or gallops  ABDOMEN: Soft, Nontender, Nondistended; Bowel sounds present  EXTREMITIES:  2+ Peripheral Pulses, restraints  SKIN: No rashes or lesions  NEURO: AAOX3,     LABS:                        8.9    6.16  )-----------( 261      ( 23 Nov 2023 10:55 )             26.3     11-23    139  |  109<H>  |  9.9  ----------------------------<  97  3.4<L>   |  20.0<L>  |  0.63    Ca    7.7<L>      23 Nov 2023 10:55  Phos  2.1     11-23  Mg     1.8     11-23    TPro  5.0<L>  /  Alb  2.6<L>  /  TBili  0.2<L>  /  DBili  x   /  AST  16  /  ALT  12  /  AlkPhos  58  11-23    PT/INR - ( 25 Nov 2023 07:54 )   PT: 13.2 sec;   INR: 1.20 ratio               Urinalysis Basic - ( 23 Nov 2023 10:55 )    Color: x / Appearance: x / SG: x / pH: x  Gluc: 97 mg/dL / Ketone: x  / Bili: x / Urobili: x   Blood: x / Protein: x / Nitrite: x   Leuk Esterase: x / RBC: x / WBC x   Sq Epi: x / Non Sq Epi: x / Bacteria: x        CAPILLARY BLOOD GLUCOSE            RADIOLOGY & ADDITIONAL TESTS:  Results Reviewed: Y  Imaging Personally Reviewed: N  Electrocardiogram Personally Reviewed: N

## 2023-11-25 NOTE — PROGRESS NOTE ADULT - ASSESSMENT
This is a 83 year old female w/ PMHx of dementia, AAOX1 - self, CVA S/P KOFI X 3 months, presents for evaluation of rectal bleeding and diarrhea. Patient admitted to medicine and seen by GI and is s/p 1 unit prbc and scope deferred and course complicated by derlium.     .Supratherapeutic INR on warfarin  - s/p kcentra  - s/p 1unit prbc   - resolved, now 1.2  - patient declines to have family involved w/ care, discussed benefits and risks of anticoagulation, at this time patient states she does not wish to continue anticoagulation because she does not want any more bleeding episodes, risks of possible stroke explained in detail, understands risks, AAOx3, able to have constructive conversation     .CVA  pt consult    Dementia/ delirium  - AAOx3 today, lucid, constructive conversation  -will continue trazodone 50mg QD  - seroquel qhs    iron def anemia - iv iron   agree to defer scope    vit b12 def- cyanocoblamin    pt consult  dc in 1-2 days likely needs kofi

## 2023-11-26 LAB
ANION GAP SERPL CALC-SCNC: 9 MMOL/L — SIGNIFICANT CHANGE UP (ref 5–17)
ANION GAP SERPL CALC-SCNC: 9 MMOL/L — SIGNIFICANT CHANGE UP (ref 5–17)
APTT BLD: 25.9 SEC — SIGNIFICANT CHANGE UP (ref 24.5–35.6)
APTT BLD: 25.9 SEC — SIGNIFICANT CHANGE UP (ref 24.5–35.6)
BUN SERPL-MCNC: 16.7 MG/DL — SIGNIFICANT CHANGE UP (ref 8–20)
BUN SERPL-MCNC: 16.7 MG/DL — SIGNIFICANT CHANGE UP (ref 8–20)
CALCIUM SERPL-MCNC: 7.7 MG/DL — LOW (ref 8.4–10.5)
CALCIUM SERPL-MCNC: 7.7 MG/DL — LOW (ref 8.4–10.5)
CHLORIDE SERPL-SCNC: 106 MMOL/L — SIGNIFICANT CHANGE UP (ref 96–108)
CHLORIDE SERPL-SCNC: 106 MMOL/L — SIGNIFICANT CHANGE UP (ref 96–108)
CO2 SERPL-SCNC: 26 MMOL/L — SIGNIFICANT CHANGE UP (ref 22–29)
CO2 SERPL-SCNC: 26 MMOL/L — SIGNIFICANT CHANGE UP (ref 22–29)
CREAT SERPL-MCNC: 0.64 MG/DL — SIGNIFICANT CHANGE UP (ref 0.5–1.3)
CREAT SERPL-MCNC: 0.64 MG/DL — SIGNIFICANT CHANGE UP (ref 0.5–1.3)
EGFR: 88 ML/MIN/1.73M2 — SIGNIFICANT CHANGE UP
EGFR: 88 ML/MIN/1.73M2 — SIGNIFICANT CHANGE UP
GLUCOSE SERPL-MCNC: 109 MG/DL — HIGH (ref 70–99)
GLUCOSE SERPL-MCNC: 109 MG/DL — HIGH (ref 70–99)
HCT VFR BLD CALC: 25 % — LOW (ref 34.5–45)
HCT VFR BLD CALC: 25 % — LOW (ref 34.5–45)
HGB BLD-MCNC: 8.2 G/DL — LOW (ref 11.5–15.5)
HGB BLD-MCNC: 8.2 G/DL — LOW (ref 11.5–15.5)
INR BLD: 1.23 RATIO — HIGH (ref 0.85–1.18)
INR BLD: 1.23 RATIO — HIGH (ref 0.85–1.18)
MCHC RBC-ENTMCNC: 32.4 PG — SIGNIFICANT CHANGE UP (ref 27–34)
MCHC RBC-ENTMCNC: 32.4 PG — SIGNIFICANT CHANGE UP (ref 27–34)
MCHC RBC-ENTMCNC: 32.8 GM/DL — SIGNIFICANT CHANGE UP (ref 32–36)
MCHC RBC-ENTMCNC: 32.8 GM/DL — SIGNIFICANT CHANGE UP (ref 32–36)
MCV RBC AUTO: 98.8 FL — SIGNIFICANT CHANGE UP (ref 80–100)
MCV RBC AUTO: 98.8 FL — SIGNIFICANT CHANGE UP (ref 80–100)
PLATELET # BLD AUTO: 258 K/UL — SIGNIFICANT CHANGE UP (ref 150–400)
PLATELET # BLD AUTO: 258 K/UL — SIGNIFICANT CHANGE UP (ref 150–400)
POTASSIUM SERPL-MCNC: 3.6 MMOL/L — SIGNIFICANT CHANGE UP (ref 3.5–5.3)
POTASSIUM SERPL-MCNC: 3.6 MMOL/L — SIGNIFICANT CHANGE UP (ref 3.5–5.3)
POTASSIUM SERPL-SCNC: 3.6 MMOL/L — SIGNIFICANT CHANGE UP (ref 3.5–5.3)
POTASSIUM SERPL-SCNC: 3.6 MMOL/L — SIGNIFICANT CHANGE UP (ref 3.5–5.3)
PROTHROM AB SERPL-ACNC: 13.6 SEC — HIGH (ref 9.5–13)
PROTHROM AB SERPL-ACNC: 13.6 SEC — HIGH (ref 9.5–13)
RBC # BLD: 2.53 M/UL — LOW (ref 3.8–5.2)
RBC # BLD: 2.53 M/UL — LOW (ref 3.8–5.2)
RBC # FLD: 14.9 % — HIGH (ref 10.3–14.5)
RBC # FLD: 14.9 % — HIGH (ref 10.3–14.5)
SODIUM SERPL-SCNC: 141 MMOL/L — SIGNIFICANT CHANGE UP (ref 135–145)
SODIUM SERPL-SCNC: 141 MMOL/L — SIGNIFICANT CHANGE UP (ref 135–145)
WBC # BLD: 7.17 K/UL — SIGNIFICANT CHANGE UP (ref 3.8–10.5)
WBC # BLD: 7.17 K/UL — SIGNIFICANT CHANGE UP (ref 3.8–10.5)
WBC # FLD AUTO: 7.17 K/UL — SIGNIFICANT CHANGE UP (ref 3.8–10.5)
WBC # FLD AUTO: 7.17 K/UL — SIGNIFICANT CHANGE UP (ref 3.8–10.5)

## 2023-11-26 PROCEDURE — 99232 SBSQ HOSP IP/OBS MODERATE 35: CPT

## 2023-11-26 RX ADMIN — PREGABALIN 1000 MICROGRAM(S): 225 CAPSULE ORAL at 11:24

## 2023-11-26 RX ADMIN — Medication 650 MILLIGRAM(S): at 23:00

## 2023-11-26 RX ADMIN — Medication 100 MILLIGRAM(S): at 11:23

## 2023-11-26 RX ADMIN — Medication 650 MILLIGRAM(S): at 11:23

## 2023-11-26 RX ADMIN — ESCITALOPRAM OXALATE 10 MILLIGRAM(S): 10 TABLET, FILM COATED ORAL at 11:23

## 2023-11-26 RX ADMIN — IRON SUCROSE 110 MILLIGRAM(S): 20 INJECTION, SOLUTION INTRAVENOUS at 13:39

## 2023-11-26 RX ADMIN — PANTOPRAZOLE SODIUM 40 MILLIGRAM(S): 20 TABLET, DELAYED RELEASE ORAL at 11:24

## 2023-11-26 RX ADMIN — QUETIAPINE FUMARATE 50 MILLIGRAM(S): 200 TABLET, FILM COATED ORAL at 21:27

## 2023-11-26 RX ADMIN — Medication 650 MILLIGRAM(S): at 12:23

## 2023-11-26 RX ADMIN — Medication 650 MILLIGRAM(S): at 21:27

## 2023-11-26 NOTE — PROGRESS NOTE ADULT - SUBJECTIVE AND OBJECTIVE BOX
Hospitalist Progress Note    Chief Complaint:  Rectal bleeding    SUBJECTIVE / OVERNIGHT EVENTS:  No events overnight, patient seen at bedside, in NAD, no complaints today. Patient denies chest pain, SOB, abd pain, N/V, fever, chills, dysuria or any other complaints. All remainder ROS negative.       MEDICATIONS  (STANDING):  cyanocobalamin 1000 MICROGram(s) Oral daily  escitalopram 10 milliGRAM(s) Oral daily  iron sucrose IVPB 200 milliGRAM(s) IV Intermittent every 24 hours  pantoprazole  Injectable 40 milliGRAM(s) IV Push daily  QUEtiapine 50 milliGRAM(s) Oral at bedtime  traZODone 100 milliGRAM(s) Oral daily    MEDICATIONS  (PRN):  acetaminophen     Tablet .. 650 milliGRAM(s) Oral every 6 hours PRN Temp greater or equal to 38C (100.4F), Mild Pain (1 - 3)  aluminum hydroxide/magnesium hydroxide/simethicone Suspension 30 milliLiter(s) Oral every 4 hours PRN Dyspepsia  melatonin 3 milliGRAM(s) Oral at bedtime PRN Insomnia  OLANZapine Injectable 2.5 milliGRAM(s) IntraMuscular every 6 hours PRN for agitation  ondansetron Injectable 4 milliGRAM(s) IV Push every 8 hours PRN Nausea and/or Vomiting        I&O's Summary    25 Nov 2023 07:01  -  26 Nov 2023 07:00  --------------------------------------------------------  IN: 120 mL / OUT: 350 mL / NET: -230 mL        PHYSICAL EXAM:  Vital Signs Last 24 Hrs  T(C): 36.8 (26 Nov 2023 09:35), Max: 36.9 (26 Nov 2023 05:35)  T(F): 98.3 (26 Nov 2023 09:35), Max: 98.5 (26 Nov 2023 05:35)  HR: 62 (26 Nov 2023 09:35) (62 - 88)  BP: 120/69 (26 Nov 2023 09:35) (103/60 - 125/60)  BP(mean): --  RR: 18 (26 Nov 2023 09:35) (18 - 18)  SpO2: 93% (26 Nov 2023 09:35) (93% - 98%)    Parameters below as of 26 Nov 2023 09:35  Patient On (Oxygen Delivery Method): room air      GENERAL: NAD, pleasant, AAOx3, has constructive conversation  HEENT: PERRL, +EOMI  NECK: soft, Supple, No JVD,   CHEST/LUNG: Clear to auscultation bilaterally; No wheezing  HEART: S1S2+, Regular rate and rhythm; No murmurs, rubs, or gallops  ABDOMEN: Soft, Nontender, Nondistended; Bowel sounds present  EXTREMITIES:  2+ Peripheral Pulses, restraints  SKIN: No rashes or lesions  NEURO: AAOX2-3, understands situation, takes part in conversation, CN II-XII grossly normal     LABS:                        8.2    7.17  )-----------( 258      ( 26 Nov 2023 05:57 )             25.0     11-26    141  |  106  |  16.7  ----------------------------<  109<H>  3.6   |  26.0  |  0.64    Ca    7.7<L>      26 Nov 2023 05:57      PT/INR - ( 26 Nov 2023 05:57 )   PT: 13.6 sec;   INR: 1.23 ratio         PTT - ( 26 Nov 2023 05:57 )  PTT:25.9 sec      Urinalysis Basic - ( 26 Nov 2023 05:57 )    Color: x / Appearance: x / SG: x / pH: x  Gluc: 109 mg/dL / Ketone: x  / Bili: x / Urobili: x   Blood: x / Protein: x / Nitrite: x   Leuk Esterase: x / RBC: x / WBC x   Sq Epi: x / Non Sq Epi: x / Bacteria: x        CAPILLARY BLOOD GLUCOSE            RADIOLOGY & ADDITIONAL TESTS:  Results Reviewed: Y  Imaging Personally Reviewed: N  Electrocardiogram Personally Reviewed: TEJ

## 2023-11-26 NOTE — PROGRESS NOTE ADULT - ASSESSMENT
This is a 83 year old female w/ PMHx of dementia, AAOX1 - self, CVA S/P MHOAN X 3 months, presents for evaluation of rectal bleeding and diarrhea. Patient admitted to medicine and seen by GI and is s/p 1 unit prbc and scope deferred and course complicated by derlium.     .Supratherapeutic INR on warfarin  - s/p kcentra  - s/p 1unit prbc   - resolved, now 1.2  - patient declines to have family involved w/ care, discussed benefits and risks of anticoagulation, at this time patient states she does not wish to continue anticoagulation because she does not want any more bleeding episodes, risks of possible stroke explained in detail, understands risks, AAOx3, able to have constructive conversation     .CVA  pt consult    Dementia/ delirium  - AAOx3 today, lucid, constructive conversation  -will continue trazodone 50mg QD  - seroquel qhs    iron def anemia - iv iron   agree to defer scope    vit b12 def- cyanocoblamin    pt consult - MOHAN  medically optimal for MOHAN  patient does not wish for family to know about her care, has hx of dementia however is lucid on conversation w/ attending   This is a 83 year old female w/ PMHx of dementia, AAOX1 - self, CVA S/P MOHAN X 3 months, presents for evaluation of rectal bleeding and diarrhea. Patient admitted to medicine and seen by GI and is s/p 1 unit prbc and scope deferred and course complicated by derlium.     .Supratherapeutic INR on warfarin  - s/p kcentra  - s/p 1unit prbc   - resolved, now 1.2  - patient declines to have family involved w/ care, discussed benefits and risks of anticoagulation, at this time patient states she does not wish to continue anticoagulation because she does not want any more bleeding episodes, risks of possible stroke explained in detail, understands risks, AAOx3, able to have constructive conversation     .CVA  pt consult    Dementia/ delirium  - AAOx3 today, lucid, constructive conversation  -trazodone 100mg QD  - seroquel qhs    iron def anemia - iv iron   agree to defer scope    vit b12 def- cyanocoblamin    pt consult - MOHAN  medically optimal for MOHAN  patient does not wish for family to know about her care, has hx of dementia however is lucid on conversation w/ attending

## 2023-11-27 LAB
ALBUMIN SERPL ELPH-MCNC: 2.4 G/DL — LOW (ref 3.3–5.2)
ALBUMIN SERPL ELPH-MCNC: 2.4 G/DL — LOW (ref 3.3–5.2)
ALP SERPL-CCNC: 71 U/L — SIGNIFICANT CHANGE UP (ref 40–120)
ALP SERPL-CCNC: 71 U/L — SIGNIFICANT CHANGE UP (ref 40–120)
ALT FLD-CCNC: 13 U/L — SIGNIFICANT CHANGE UP
ALT FLD-CCNC: 13 U/L — SIGNIFICANT CHANGE UP
ANION GAP SERPL CALC-SCNC: 8 MMOL/L — SIGNIFICANT CHANGE UP (ref 5–17)
ANION GAP SERPL CALC-SCNC: 8 MMOL/L — SIGNIFICANT CHANGE UP (ref 5–17)
APTT BLD: 30.6 SEC — SIGNIFICANT CHANGE UP (ref 24.5–35.6)
APTT BLD: 30.6 SEC — SIGNIFICANT CHANGE UP (ref 24.5–35.6)
AST SERPL-CCNC: 22 U/L — SIGNIFICANT CHANGE UP
AST SERPL-CCNC: 22 U/L — SIGNIFICANT CHANGE UP
BASOPHILS # BLD AUTO: 0.03 K/UL — SIGNIFICANT CHANGE UP (ref 0–0.2)
BASOPHILS # BLD AUTO: 0.03 K/UL — SIGNIFICANT CHANGE UP (ref 0–0.2)
BASOPHILS NFR BLD AUTO: 0.5 % — SIGNIFICANT CHANGE UP (ref 0–2)
BASOPHILS NFR BLD AUTO: 0.5 % — SIGNIFICANT CHANGE UP (ref 0–2)
BILIRUB SERPL-MCNC: <0.2 MG/DL — LOW (ref 0.4–2)
BILIRUB SERPL-MCNC: <0.2 MG/DL — LOW (ref 0.4–2)
BUN SERPL-MCNC: 15.5 MG/DL — SIGNIFICANT CHANGE UP (ref 8–20)
BUN SERPL-MCNC: 15.5 MG/DL — SIGNIFICANT CHANGE UP (ref 8–20)
CALCIUM SERPL-MCNC: 7.7 MG/DL — LOW (ref 8.4–10.5)
CALCIUM SERPL-MCNC: 7.7 MG/DL — LOW (ref 8.4–10.5)
CALPROTECTIN STL-MCNT: 107 UG/G — SIGNIFICANT CHANGE UP (ref 0–120)
CALPROTECTIN STL-MCNT: 107 UG/G — SIGNIFICANT CHANGE UP (ref 0–120)
CHLORIDE SERPL-SCNC: 105 MMOL/L — SIGNIFICANT CHANGE UP (ref 96–108)
CHLORIDE SERPL-SCNC: 105 MMOL/L — SIGNIFICANT CHANGE UP (ref 96–108)
CO2 SERPL-SCNC: 27 MMOL/L — SIGNIFICANT CHANGE UP (ref 22–29)
CO2 SERPL-SCNC: 27 MMOL/L — SIGNIFICANT CHANGE UP (ref 22–29)
CREAT SERPL-MCNC: 0.77 MG/DL — SIGNIFICANT CHANGE UP (ref 0.5–1.3)
CREAT SERPL-MCNC: 0.77 MG/DL — SIGNIFICANT CHANGE UP (ref 0.5–1.3)
CULTURE RESULTS: SIGNIFICANT CHANGE UP
CULTURE RESULTS: SIGNIFICANT CHANGE UP
EGFR: 76 ML/MIN/1.73M2 — SIGNIFICANT CHANGE UP
EGFR: 76 ML/MIN/1.73M2 — SIGNIFICANT CHANGE UP
ENDOMYSIUM IGA TITR SER IF: NEGATIVE — SIGNIFICANT CHANGE UP
ENDOMYSIUM IGA TITR SER IF: NEGATIVE — SIGNIFICANT CHANGE UP
ENDOMYSIUM IGA TITR SER: SIGNIFICANT CHANGE UP
ENDOMYSIUM IGA TITR SER: SIGNIFICANT CHANGE UP
EOSINOPHIL # BLD AUTO: 0.13 K/UL — SIGNIFICANT CHANGE UP (ref 0–0.5)
EOSINOPHIL # BLD AUTO: 0.13 K/UL — SIGNIFICANT CHANGE UP (ref 0–0.5)
EOSINOPHIL NFR BLD AUTO: 2 % — SIGNIFICANT CHANGE UP (ref 0–6)
EOSINOPHIL NFR BLD AUTO: 2 % — SIGNIFICANT CHANGE UP (ref 0–6)
GLUCOSE SERPL-MCNC: 109 MG/DL — HIGH (ref 70–99)
GLUCOSE SERPL-MCNC: 109 MG/DL — HIGH (ref 70–99)
HCT VFR BLD CALC: 25.5 % — LOW (ref 34.5–45)
HCT VFR BLD CALC: 25.5 % — LOW (ref 34.5–45)
HGB BLD-MCNC: 8.3 G/DL — LOW (ref 11.5–15.5)
HGB BLD-MCNC: 8.3 G/DL — LOW (ref 11.5–15.5)
IMM GRANULOCYTES NFR BLD AUTO: 0.9 % — SIGNIFICANT CHANGE UP (ref 0–0.9)
IMM GRANULOCYTES NFR BLD AUTO: 0.9 % — SIGNIFICANT CHANGE UP (ref 0–0.9)
INR BLD: 1.15 RATIO — SIGNIFICANT CHANGE UP (ref 0.85–1.18)
INR BLD: 1.15 RATIO — SIGNIFICANT CHANGE UP (ref 0.85–1.18)
LAB BILL ONLY ITEM: SIGNIFICANT CHANGE UP
LAB BILL ONLY ITEM: SIGNIFICANT CHANGE UP
LACTATE BLDV-MCNC: 1.4 MMOL/L — SIGNIFICANT CHANGE UP (ref 0.5–2)
LACTATE BLDV-MCNC: 1.4 MMOL/L — SIGNIFICANT CHANGE UP (ref 0.5–2)
LYMPHOCYTES # BLD AUTO: 0.56 K/UL — LOW (ref 1–3.3)
LYMPHOCYTES # BLD AUTO: 0.56 K/UL — LOW (ref 1–3.3)
LYMPHOCYTES # BLD AUTO: 8.5 % — LOW (ref 13–44)
LYMPHOCYTES # BLD AUTO: 8.5 % — LOW (ref 13–44)
MCHC RBC-ENTMCNC: 32.5 GM/DL — SIGNIFICANT CHANGE UP (ref 32–36)
MCHC RBC-ENTMCNC: 32.5 GM/DL — SIGNIFICANT CHANGE UP (ref 32–36)
MCHC RBC-ENTMCNC: 33.1 PG — SIGNIFICANT CHANGE UP (ref 27–34)
MCHC RBC-ENTMCNC: 33.1 PG — SIGNIFICANT CHANGE UP (ref 27–34)
MCV RBC AUTO: 101.6 FL — HIGH (ref 80–100)
MCV RBC AUTO: 101.6 FL — HIGH (ref 80–100)
MONOCYTES # BLD AUTO: 0.6 K/UL — SIGNIFICANT CHANGE UP (ref 0–0.9)
MONOCYTES # BLD AUTO: 0.6 K/UL — SIGNIFICANT CHANGE UP (ref 0–0.9)
MONOCYTES NFR BLD AUTO: 9.1 % — SIGNIFICANT CHANGE UP (ref 2–14)
MONOCYTES NFR BLD AUTO: 9.1 % — SIGNIFICANT CHANGE UP (ref 2–14)
NEUTROPHILS # BLD AUTO: 5.2 K/UL — SIGNIFICANT CHANGE UP (ref 1.8–7.4)
NEUTROPHILS # BLD AUTO: 5.2 K/UL — SIGNIFICANT CHANGE UP (ref 1.8–7.4)
NEUTROPHILS NFR BLD AUTO: 79 % — HIGH (ref 43–77)
NEUTROPHILS NFR BLD AUTO: 79 % — HIGH (ref 43–77)
PLATELET # BLD AUTO: 237 K/UL — SIGNIFICANT CHANGE UP (ref 150–400)
PLATELET # BLD AUTO: 237 K/UL — SIGNIFICANT CHANGE UP (ref 150–400)
POTASSIUM SERPL-MCNC: 3.6 MMOL/L — SIGNIFICANT CHANGE UP (ref 3.5–5.3)
POTASSIUM SERPL-MCNC: 3.6 MMOL/L — SIGNIFICANT CHANGE UP (ref 3.5–5.3)
POTASSIUM SERPL-SCNC: 3.6 MMOL/L — SIGNIFICANT CHANGE UP (ref 3.5–5.3)
POTASSIUM SERPL-SCNC: 3.6 MMOL/L — SIGNIFICANT CHANGE UP (ref 3.5–5.3)
PROCALCITONIN SERPL-MCNC: 0.49 NG/ML — HIGH (ref 0.02–0.1)
PROCALCITONIN SERPL-MCNC: 0.49 NG/ML — HIGH (ref 0.02–0.1)
PROT SERPL-MCNC: 4.7 G/DL — LOW (ref 6.6–8.7)
PROT SERPL-MCNC: 4.7 G/DL — LOW (ref 6.6–8.7)
PROTHROM AB SERPL-ACNC: 12.7 SEC — SIGNIFICANT CHANGE UP (ref 9.5–13)
PROTHROM AB SERPL-ACNC: 12.7 SEC — SIGNIFICANT CHANGE UP (ref 9.5–13)
RBC # BLD: 2.51 M/UL — LOW (ref 3.8–5.2)
RBC # BLD: 2.51 M/UL — LOW (ref 3.8–5.2)
RBC # FLD: 15.5 % — HIGH (ref 10.3–14.5)
RBC # FLD: 15.5 % — HIGH (ref 10.3–14.5)
SODIUM SERPL-SCNC: 140 MMOL/L — SIGNIFICANT CHANGE UP (ref 135–145)
SODIUM SERPL-SCNC: 140 MMOL/L — SIGNIFICANT CHANGE UP (ref 135–145)
SPECIMEN SOURCE: SIGNIFICANT CHANGE UP
SPECIMEN SOURCE: SIGNIFICANT CHANGE UP
WBC # BLD: 6.58 K/UL — SIGNIFICANT CHANGE UP (ref 3.8–10.5)
WBC # BLD: 6.58 K/UL — SIGNIFICANT CHANGE UP (ref 3.8–10.5)
WBC # FLD AUTO: 6.58 K/UL — SIGNIFICANT CHANGE UP (ref 3.8–10.5)
WBC # FLD AUTO: 6.58 K/UL — SIGNIFICANT CHANGE UP (ref 3.8–10.5)

## 2023-11-27 PROCEDURE — 99232 SBSQ HOSP IP/OBS MODERATE 35: CPT

## 2023-11-27 PROCEDURE — 71045 X-RAY EXAM CHEST 1 VIEW: CPT | Mod: 26

## 2023-11-27 RX ORDER — TRAZODONE HCL 50 MG
100 TABLET ORAL DAILY
Refills: 0 | Status: DISCONTINUED | OUTPATIENT
Start: 2023-11-27 | End: 2023-11-29

## 2023-11-27 RX ORDER — DIPHENOXYLATE HCL/ATROPINE 2.5-.025MG
2 TABLET ORAL EVERY 6 HOURS
Refills: 0 | Status: DISCONTINUED | OUTPATIENT
Start: 2023-11-27 | End: 2023-11-29

## 2023-11-27 RX ORDER — LOPERAMIDE HCL 2 MG
2 TABLET ORAL EVERY 4 HOURS
Refills: 0 | Status: DISCONTINUED | OUTPATIENT
Start: 2023-11-27 | End: 2023-11-29

## 2023-11-27 RX ADMIN — Medication 2 TABLET(S): at 14:00

## 2023-11-27 RX ADMIN — Medication 650 MILLIGRAM(S): at 04:07

## 2023-11-27 RX ADMIN — PANTOPRAZOLE SODIUM 40 MILLIGRAM(S): 20 TABLET, DELAYED RELEASE ORAL at 13:00

## 2023-11-27 RX ADMIN — Medication 2 TABLET(S): at 18:20

## 2023-11-27 RX ADMIN — Medication 650 MILLIGRAM(S): at 04:37

## 2023-11-27 RX ADMIN — QUETIAPINE FUMARATE 50 MILLIGRAM(S): 200 TABLET, FILM COATED ORAL at 22:06

## 2023-11-27 RX ADMIN — ESCITALOPRAM OXALATE 10 MILLIGRAM(S): 10 TABLET, FILM COATED ORAL at 13:01

## 2023-11-27 RX ADMIN — Medication 100 MILLIGRAM(S): at 13:56

## 2023-11-27 RX ADMIN — PREGABALIN 1000 MICROGRAM(S): 225 CAPSULE ORAL at 13:01

## 2023-11-27 RX ADMIN — IRON SUCROSE 110 MILLIGRAM(S): 20 INJECTION, SOLUTION INTRAVENOUS at 13:00

## 2023-11-27 NOTE — PROGRESS NOTE ADULT - ASSESSMENT
This is a 83 year old female w/ PMHx of dementia, AAOX1 - self, CVA S/P MOHAN X 3 months, presents for evaluation of rectal bleeding and diarrhea. Patient admitted to medicine and seen by GI and is s/p 1 unit prbc and scope deferred and course complicated by derlium.     .Fever  likely due to so many blankets overnight  WBC normal  - f/u cultures  - monitor off abx    .Supratherapeutic INR on warfarin  - s/p kcentra  - s/p 1unit prbc   - resolved, now 1.2  - Patient mental status now waning, discussed w/ psych and advised to talk to family for patient safety. Discussed case w/ daughter who agrees w/ plan and wishes to hold off further AC and send patient to rehab.    .CVA  pt consult    Dementia/ delirium  - AAOx2 today, confabulating  -trazodone 100mg QHS  - seroquel qhs    iron def anemia - iv iron   agree to defer scope    vit b12 def- cyanocoblamin    pt consult - MOHAN  Dispo: MOHAN pending 24 hour fever free

## 2023-11-27 NOTE — PROGRESS NOTE ADULT - SUBJECTIVE AND OBJECTIVE BOX
Hospitalist Progress Note    Chief Complaint:  Rectal bleeding    SUBJECTIVE / OVERNIGHT EVENTS:  fever overnight, blood cx drawn, patient seen at bedside, in NAD, complaining of pain over R wrist where IV site was. Confabulating today, less coherent, but able to answer ROS questions. Patient denies chest pain, SOB, abd pain, N/V, fever, chills, dysuria or any other complaints. All remainder ROS negative.     MEDICATIONS  (STANDING):  cyanocobalamin 1000 MICROGram(s) Oral daily  diphenoxylate/atropine 2 Tablet(s) Oral every 6 hours  escitalopram 10 milliGRAM(s) Oral daily  iron sucrose IVPB 200 milliGRAM(s) IV Intermittent every 24 hours  pantoprazole  Injectable 40 milliGRAM(s) IV Push daily  QUEtiapine 50 milliGRAM(s) Oral at bedtime  traZODone 100 milliGRAM(s) Oral daily    MEDICATIONS  (PRN):  acetaminophen     Tablet .. 650 milliGRAM(s) Oral every 6 hours PRN Temp greater or equal to 38C (100.4F), Mild Pain (1 - 3)  aluminum hydroxide/magnesium hydroxide/simethicone Suspension 30 milliLiter(s) Oral every 4 hours PRN Dyspepsia  loperamide 2 milliGRAM(s) Oral every 4 hours PRN Diarrhea  melatonin 3 milliGRAM(s) Oral at bedtime PRN Insomnia  OLANZapine Injectable 2.5 milliGRAM(s) IntraMuscular every 6 hours PRN for agitation  ondansetron Injectable 4 milliGRAM(s) IV Push every 8 hours PRN Nausea and/or Vomiting        I&O's Summary      PHYSICAL EXAM:  Vital Signs Last 24 Hrs  T(C): 37.8 (27 Nov 2023 09:25), Max: 38.2 (27 Nov 2023 05:33)  T(F): 100 (27 Nov 2023 09:25), Max: 100.8 (27 Nov 2023 05:33)  HR: 96 (27 Nov 2023 09:25) (80 - 96)  BP: 95/65 (27 Nov 2023 09:25) (95/65 - 135/65)  BP(mean): --  RR: 18 (27 Nov 2023 09:25) (18 - 18)  SpO2: 96% (27 Nov 2023 09:25) (93% - 96%)    Parameters below as of 27 Nov 2023 09:25  Patient On (Oxygen Delivery Method): room air          GENERAL: NAD, pleasant, AAOx3, has constructive conversation  HEENT: PERRL, +EOMI  NECK: soft, Supple, No JVD,   CHEST/LUNG: Clear to auscultation bilaterally; No wheezing  HEART: S1S2+, Regular rate and rhythm; No murmurs, rubs, or gallops  ABDOMEN: Soft, Nontender, Nondistended; Bowel sounds present  EXTREMITIES:  2+ Peripheral Pulses, restraints  SKIN: No rashes or lesions  NEURO: AAOX2, confabulating today, motor and sensory grossly normal  Psych: confused, irritable    LABS:                        8.3    6.58  )-----------( 237      ( 27 Nov 2023 07:13 )             25.5     11-27    140  |  105  |  15.5  ----------------------------<  109<H>  3.6   |  27.0  |  0.77    Ca    7.7<L>      27 Nov 2023 07:13    TPro  4.7<L>  /  Alb  2.4<L>  /  TBili  <0.2<L>  /  DBili  x   /  AST  22  /  ALT  13  /  AlkPhos  71  11-27    PT/INR - ( 27 Nov 2023 07:13 )   PT: 12.7 sec;   INR: 1.15 ratio         PTT - ( 27 Nov 2023 07:13 )  PTT:30.6 sec      Urinalysis Basic - ( 27 Nov 2023 07:13 )    Color: x / Appearance: x / SG: x / pH: x  Gluc: 109 mg/dL / Ketone: x  / Bili: x / Urobili: x   Blood: x / Protein: x / Nitrite: x   Leuk Esterase: x / RBC: x / WBC x   Sq Epi: x / Non Sq Epi: x / Bacteria: x        CAPILLARY BLOOD GLUCOSE            RADIOLOGY & ADDITIONAL TESTS:  Results Reviewed: Y  Imaging Personally Reviewed: N  Electrocardiogram Personally Reviewed: TEJ

## 2023-11-27 NOTE — CHART NOTE - NSCHARTNOTEFT_GEN_A_CORE
This is a 83 year old female w/ PMHx of dementia, AAOX1 - self, CVA S/P MOHAN X 3 months, presents for evaluation of rectal bleeding and diarrhea. Patient admitted to medicine and seen by GI and is s/p 1 unit prbc and scope deferred and course complicated by derlium.   Patient with rectal temp 100.8, no leukocytosis   No obvious source of infection   ROS: No chest pain, palpitations, SOB, light headedness, dizziness, dysuria     Vital Signs  T(F): 100.8  HR: 93   BP: 117/65   RR: 18   SpO2: 93%    GENERAL: NAD, lying comfortably  NERVOUS SYSTEM: Alert and awake, Good concentration  CHEST/LUNG: Clear to auscultation b/l; Unlabored respirations  HEART: S1S2+, Regular rate and rhythm  ABDOMEN: Soft, Nontender, Nondistended; BS+   EXTREMITIES:  2+ Peripheral Pulses, No LE edema, no tenderness to palpation of b/l LE  SKIN: Warm and dry    Fever work up ordered This is a 83 year old female w/ PMHx of dementia, AAOX1 - self, CVA S/P MOHAN X 3 months, presents for evaluation of rectal bleeding and diarrhea. Patient admitted to medicine and seen by GI and is s/p 1 unit prbc and scope deferred and course complicated by derlium.   Patient with rectal temp 100.8, no leukocytosis   No obvious source of infection   ROS: No chest pain, palpitations, SOB, light headedness, dizziness, dysuria     Vital Signs  T(F): 100.8  HR: 93   BP: 117/65   RR: 18   SpO2: 93%    GENERAL: NAD, lying comfortably  NERVOUS SYSTEM: Alert and awake, Good concentration  CHEST/LUNG: Clear to auscultation b/l; Unlabored respirations  HEART: S1S2+, Regular rate and rhythm  ABDOMEN: Soft, Nontender, Nondistended; BS+   EXTREMITIES:  2+ Peripheral Pulses, No LE edema, no tenderness to palpation of b/l LE  SKIN: Warm and dry    Fever work up ordered -  blood cx x2, lactate, CMP, CBC, lactate, coags, procal, CXR, UA, RVP/COVID   RN to notify with any acute changes

## 2023-11-28 DIAGNOSIS — J90 PLEURAL EFFUSION, NOT ELSEWHERE CLASSIFIED: ICD-10-CM

## 2023-11-28 LAB
ANION GAP SERPL CALC-SCNC: 11 MMOL/L — SIGNIFICANT CHANGE UP (ref 5–17)
ANION GAP SERPL CALC-SCNC: 11 MMOL/L — SIGNIFICANT CHANGE UP (ref 5–17)
BUN SERPL-MCNC: 12.9 MG/DL — SIGNIFICANT CHANGE UP (ref 8–20)
BUN SERPL-MCNC: 12.9 MG/DL — SIGNIFICANT CHANGE UP (ref 8–20)
CALCIUM SERPL-MCNC: 7.8 MG/DL — LOW (ref 8.4–10.5)
CALCIUM SERPL-MCNC: 7.8 MG/DL — LOW (ref 8.4–10.5)
CHLORIDE SERPL-SCNC: 100 MMOL/L — SIGNIFICANT CHANGE UP (ref 96–108)
CHLORIDE SERPL-SCNC: 100 MMOL/L — SIGNIFICANT CHANGE UP (ref 96–108)
CO2 SERPL-SCNC: 25 MMOL/L — SIGNIFICANT CHANGE UP (ref 22–29)
CO2 SERPL-SCNC: 25 MMOL/L — SIGNIFICANT CHANGE UP (ref 22–29)
CREAT SERPL-MCNC: 0.61 MG/DL — SIGNIFICANT CHANGE UP (ref 0.5–1.3)
CREAT SERPL-MCNC: 0.61 MG/DL — SIGNIFICANT CHANGE UP (ref 0.5–1.3)
EGFR: 89 ML/MIN/1.73M2 — SIGNIFICANT CHANGE UP
EGFR: 89 ML/MIN/1.73M2 — SIGNIFICANT CHANGE UP
ELASTASE PANC STL-MCNT: 101 CD:794062645 — LOW
ELASTASE PANC STL-MCNT: 101 CD:794062645 — LOW
GLUCOSE SERPL-MCNC: 103 MG/DL — HIGH (ref 70–99)
GLUCOSE SERPL-MCNC: 103 MG/DL — HIGH (ref 70–99)
HCT VFR BLD CALC: 28.6 % — LOW (ref 34.5–45)
HCT VFR BLD CALC: 28.6 % — LOW (ref 34.5–45)
HGB BLD-MCNC: 9.3 G/DL — LOW (ref 11.5–15.5)
HGB BLD-MCNC: 9.3 G/DL — LOW (ref 11.5–15.5)
MCHC RBC-ENTMCNC: 32.5 GM/DL — SIGNIFICANT CHANGE UP (ref 32–36)
MCHC RBC-ENTMCNC: 32.5 GM/DL — SIGNIFICANT CHANGE UP (ref 32–36)
MCHC RBC-ENTMCNC: 32.9 PG — SIGNIFICANT CHANGE UP (ref 27–34)
MCHC RBC-ENTMCNC: 32.9 PG — SIGNIFICANT CHANGE UP (ref 27–34)
MCV RBC AUTO: 101.1 FL — HIGH (ref 80–100)
MCV RBC AUTO: 101.1 FL — HIGH (ref 80–100)
PLATELET # BLD AUTO: 248 K/UL — SIGNIFICANT CHANGE UP (ref 150–400)
PLATELET # BLD AUTO: 248 K/UL — SIGNIFICANT CHANGE UP (ref 150–400)
POTASSIUM SERPL-MCNC: 3.2 MMOL/L — LOW (ref 3.5–5.3)
POTASSIUM SERPL-MCNC: 3.2 MMOL/L — LOW (ref 3.5–5.3)
POTASSIUM SERPL-SCNC: 3.2 MMOL/L — LOW (ref 3.5–5.3)
POTASSIUM SERPL-SCNC: 3.2 MMOL/L — LOW (ref 3.5–5.3)
RBC # BLD: 2.83 M/UL — LOW (ref 3.8–5.2)
RBC # BLD: 2.83 M/UL — LOW (ref 3.8–5.2)
RBC # FLD: 15.2 % — HIGH (ref 10.3–14.5)
RBC # FLD: 15.2 % — HIGH (ref 10.3–14.5)
SODIUM SERPL-SCNC: 136 MMOL/L — SIGNIFICANT CHANGE UP (ref 135–145)
SODIUM SERPL-SCNC: 136 MMOL/L — SIGNIFICANT CHANGE UP (ref 135–145)
WBC # BLD: 11.79 K/UL — HIGH (ref 3.8–10.5)
WBC # BLD: 11.79 K/UL — HIGH (ref 3.8–10.5)
WBC # FLD AUTO: 11.79 K/UL — HIGH (ref 3.8–10.5)
WBC # FLD AUTO: 11.79 K/UL — HIGH (ref 3.8–10.5)

## 2023-11-28 PROCEDURE — 99221 1ST HOSP IP/OBS SF/LOW 40: CPT

## 2023-11-28 PROCEDURE — 99232 SBSQ HOSP IP/OBS MODERATE 35: CPT

## 2023-11-28 RX ORDER — CEFTRIAXONE 500 MG/1
1000 INJECTION, POWDER, FOR SOLUTION INTRAMUSCULAR; INTRAVENOUS EVERY 24 HOURS
Refills: 0 | Status: DISCONTINUED | OUTPATIENT
Start: 2023-11-28 | End: 2023-11-29

## 2023-11-28 RX ORDER — POTASSIUM CHLORIDE 20 MEQ
40 PACKET (EA) ORAL EVERY 4 HOURS
Refills: 0 | Status: DISCONTINUED | OUTPATIENT
Start: 2023-11-28 | End: 2023-11-28

## 2023-11-28 RX ORDER — POTASSIUM CHLORIDE 20 MEQ
40 PACKET (EA) ORAL EVERY 4 HOURS
Refills: 0 | Status: COMPLETED | OUTPATIENT
Start: 2023-11-28 | End: 2023-11-28

## 2023-11-28 RX ORDER — AZITHROMYCIN 500 MG/1
500 TABLET, FILM COATED ORAL DAILY
Refills: 0 | Status: DISCONTINUED | OUTPATIENT
Start: 2023-11-28 | End: 2023-11-29

## 2023-11-28 RX ADMIN — IRON SUCROSE 110 MILLIGRAM(S): 20 INJECTION, SOLUTION INTRAVENOUS at 14:00

## 2023-11-28 RX ADMIN — QUETIAPINE FUMARATE 50 MILLIGRAM(S): 200 TABLET, FILM COATED ORAL at 21:50

## 2023-11-28 RX ADMIN — Medication 2 TABLET(S): at 01:10

## 2023-11-28 RX ADMIN — AZITHROMYCIN 500 MILLIGRAM(S): 500 TABLET, FILM COATED ORAL at 14:00

## 2023-11-28 RX ADMIN — Medication 40 MILLIEQUIVALENT(S): at 18:30

## 2023-11-28 RX ADMIN — PANTOPRAZOLE SODIUM 40 MILLIGRAM(S): 20 TABLET, DELAYED RELEASE ORAL at 18:30

## 2023-11-28 RX ADMIN — ESCITALOPRAM OXALATE 10 MILLIGRAM(S): 10 TABLET, FILM COATED ORAL at 18:30

## 2023-11-28 RX ADMIN — Medication 2 TABLET(S): at 05:19

## 2023-11-28 RX ADMIN — Medication 2 TABLET(S): at 14:00

## 2023-11-28 RX ADMIN — Medication 2 TABLET(S): at 18:30

## 2023-11-28 RX ADMIN — PREGABALIN 1000 MICROGRAM(S): 225 CAPSULE ORAL at 14:00

## 2023-11-28 RX ADMIN — CEFTRIAXONE 1000 MILLIGRAM(S): 500 INJECTION, POWDER, FOR SOLUTION INTRAMUSCULAR; INTRAVENOUS at 18:32

## 2023-11-28 RX ADMIN — Medication 40 MILLIEQUIVALENT(S): at 14:00

## 2023-11-28 RX ADMIN — Medication 100 MILLIGRAM(S): at 18:30

## 2023-11-28 NOTE — CONSULT NOTE ADULT - SUBJECTIVE AND OBJECTIVE BOX
83 year old female w/ PMHx of dementia, Alert - self, CVA S/P MOHAN X 3 months, presents for evaluation of rectal bleeding and diarrhea. Patient admitted to medicine and seen by GI and is s/p 1 unit prbc and scope deferred and course complicated by derlium.     A chest X-ray was obtained on 11/27 which showed an incidental R pleural effusion. Thoracic examined pt at the bedside. Pt was alert and oriented, follows all commands. Denies any SOB, chest pain, n, v, d, cough. POCUS showed small pleural effusions on the R side. Does not require any interventions at this time.   83 year old female w/ PMHx of dementia, Alert - self, CVA S/P MOHAN X 3 months, presents for evaluation of rectal bleeding and diarrhea. Patient admitted to medicine and seen by GI and is s/p 1 unit prbc and scope deferred and course complicated by derlium.     A chest X-ray was obtained on 11/27 which showed an incidental R pleural effusion. Thoracic examined pt at the bedside. Pt was alert and oriented, follows all commands. Denies any SOB, chest pain, n, v, d, cough. POCUS showed small pleural effusions on the R side. Pt respiratory status stable oxygenating well on RA no indication for interventions at this time.     Thoracic surgery will sign off, please reconsult if need arises.  Discussed/reviewed case with Dr. Mathews       HPI:  This is a 83 year old female w/ PMHx of dementia, AAOX1 - self, CVA S/P MOHAN X 3 months, presents for evaluation of rectal bleeding and diarrhea.  Patient is unable to provide any history and family is not available.  As per EMR, diarrhea X 2 months, and 2 weeks of BRBPR.  She was started on warfarin for unclear reasons 3 months prior while admitted for CVA at [facility], however has not had any follow up bloodwork since.  +lethargy.  +intermittent RLQ pain. presents for evaluation of rectal bleeding and diarrhea.   A chest X-ray was obtained today for infectious workup, notable for small right pleural effusion. Thoracic surgery for evaluation. Pleasant elderly female patient. Sitting up in bed, sipping on coffee. Patient seen and examined. Patient in NAD, oxygenating well room air. Patient alert to person & place. Denies CP, SOB, palpitations, cough, abd pain, N/V/D, or any other acute complaints.    PAST MEDICAL & SURGICAL HISTORY:  Dementia  CVA (cerebrovascular accident)    REVIEW OF SYSTEMS  GENERAL:  Fevers[] chills[] sweats[] fatigue[] weight loss[] weight gain []                                        NEURO:  paresthesias seizures []  syncope []  confusion []                                                                                  EYES: glasses[]  blurry vision[]  discharge[] pain[] glaucoma []                                                                            ENMT:  difficulty hearing []  vertigo[]  dysphagia[] epistaxis[] recent dental work []                                      CV:  chest pain[] palpitations[] BROWN [] diaphoresis [] edema[x]                                                                                             RESPIRATORY:  wheezing[] SOB[] cough [] sputum[] hemoptysis[]                                                                    GI:  nausea[]  vomiting []  diarrhea[] constipation [] melena []                                                                        : hematuria[ ]  dysuria[ ] urgency[] incontinence[]                                                                                              MUSCULOSKELETAL  arthritis[ ]  joint swelling [ ] muscle weakness [ ]                                                                  SKIN/BREAST:  rash[ ] itching [ ]  hair loss[ ] masses[ ]                                                                                                PSYCH:  dementia [ ] depression [ ] anxiety[ ]                                                                                                                  HEME/LYMPH:  bruises easily[ ] enlarged lymph nodes[ ] tender lymph nodes[ ]                                                 ENDOCRINE:  cold intolerance[ ] heat intolerance[ ] polydipsia[ ]       All other ROS negative except as indicated in HPI    ALLERGIES  No Known Allergies    MEDICATIONS  MEDICATIONS  (STANDING):  azithromycin   Tablet 500 milliGRAM(s) Oral daily  cefTRIAXone Injectable. 1000 milliGRAM(s) IV Push every 24 hours  cyanocobalamin 1000 MICROGram(s) Oral daily  diphenoxylate/atropine 2 Tablet(s) Oral every 6 hours  escitalopram 10 milliGRAM(s) Oral daily  iron sucrose IVPB 200 milliGRAM(s) IV Intermittent every 24 hours  pantoprazole  Injectable 40 milliGRAM(s) IV Push daily  potassium chloride   Powder 40 milliEquivalent(s) Oral every 4 hours  QUEtiapine 50 milliGRAM(s) Oral at bedtime  traZODone 100 milliGRAM(s) Oral daily    MEDICATIONS  (PRN):  acetaminophen     Tablet .. 650 milliGRAM(s) Oral every 6 hours PRN Temp greater or equal to 38C (100.4F), Mild Pain (1 - 3)  aluminum hydroxide/magnesium hydroxide/simethicone Suspension 30 milliLiter(s) Oral every 4 hours PRN Dyspepsia  loperamide 2 milliGRAM(s) Oral every 4 hours PRN Diarrhea  melatonin 3 milliGRAM(s) Oral at bedtime PRN Insomnia  OLANZapine Injectable 2.5 milliGRAM(s) IntraMuscular every 6 hours PRN for agitation  ondansetron Injectable 4 milliGRAM(s) IV Push every 8 hours PRN Nausea and/or Vomiting    SOCIAL HISTORY  Lives with daughter  Denies smoking history  Denies ETOH history    VITALS  Vital Signs Last 24 Hrs  T(C): 36.7 (28 Nov 2023 09:41), Max: 37.4 (27 Nov 2023 18:19)  T(F): 98 (28 Nov 2023 09:41), Max: 99.3 (27 Nov 2023 18:19)  HR: 78 (28 Nov 2023 09:41) (60 - 105)  BP: 104/68 (28 Nov 2023 09:41) (104/68 - 153/74)  BP(mean): --  RR: 18 (28 Nov 2023 09:41) (18 - 18)  SpO2: 96% (28 Nov 2023 09:41) (94% - 98%)    Parameters below as of 28 Nov 2023 09:41  Patient On (Oxygen Delivery Method): room air    LABS                        9.3    11.79 )-----------( 248      ( 28 Nov 2023 04:55 )             28.6     11-28    136  |  100  |  12.9  ----------------------------<  103<H>  3.2<L>   |  25.0  |  0.61    Ca    7.8<L>      28 Nov 2023 04:55    TPro  4.7<L>  /  Alb  2.4<L>  /  TBili  <0.2<L>  /  DBili  x   /  AST  22  /  ALT  13  /  AlkPhos  71  11-27    PT/INR - ( 27 Nov 2023 07:13 )   PT: 12.7 sec;   INR: 1.15 ratio       PTT - ( 27 Nov 2023 07:13 )  PTT:30.6 sec  Urinalysis Basic - ( 28 Nov 2023 04:55 )    Color: x / Appearance: x / SG: x / pH: x  Gluc: 103 mg/dL / Ketone: x  / Bili: x / Urobili: x   Blood: x / Protein: x / Nitrite: x   Leuk Esterase: x / RBC: x / WBC x   Sq Epi: x / Non Sq Epi: x / Bacteria: x    RADIOLOGY & ADDITIONAL STUDIES  All laboratory results, radiology and medications reviewed.  Xray Chest 1 View AP/PA. (11.27.23 @ 09:14)    FINDINGS:  CATHETERS AND TUBES: None    PULMONARY: Blunted RIGHT CP angle indicating small RIGHT effusion.  Remaining visualized lung parenchyma clear..   No pneumothorax.    HEART/VASCULAR: The heart and mediastinum size and configuration are   within normal limits.    BONES: Visualized osseous thorax intact.    IMPRESSION:   Suspect RIGHT pleural effusion. Confirmatory lateral   radiograph recommended..  No large airspace consolidation    PHYSICAL EXAM  Constitutional: NAD  Neck: supple, trachea midline. No JVD   Respiratory: Breath sounds clear to auscultation, no accessory muscle use noted. No wheezing, rales, or rhonchi noted b/l   Cardiovascular: Normal S1, S2; no murmurs or rub   Gastrointestinal: Soft, non-tender, non-distended  Extremities: BAILEY x 4, no peripheral edema, no cyanosis, no clubbing    Vascular: Equal and normal pulses: 2+ peripheral pulses throughout  Neurological: A+O x 3; speech clear and intact; no gross sensory/motor deficits  Psychiatric: calm, normal mood, normal affect   Skin: warm, dry, well perfused, no rashes

## 2023-11-28 NOTE — PROGRESS NOTE ADULT - ASSESSMENT
83 year old female w/ PMHx of dementia, AAOX1 - self, CVA S/P MOHAN X 3 months, presents for evaluation of rectal bleeding and diarrhea. Patient admitted to medicine and seen by GI and is s/p 1 unit prbc and scope deferred and course complicated by derlium.     .fever w/ R pleural effusion  leukocytosis  - start empiric ceftriaxone and azithromycin  - thoracic Sx for POCUS  - f/u cultures    .Supratherapeutic INR on warfarin  - s/p kcentra  - s/p 1unit prbc   - resolved, now 1.2  - Patient mental status now waning, discussed w/ psych and advised to talk to family for patient safety. Discussed case w/ daughter who agrees w/ plan and wishes to hold off further AC and send patient to rehab.    .CVA  pt consult - MOHAN    Dementia/ delirium  - AAOx2, confabulating  -trazodone 100mg QHS  - seroquel qhs    iron def anemia - iv iron   agree to defer scope    vit b12 def- cyanocoblamin    pt consult - MOHAN  Dispo: MOHAN pending improvement in Leukocytosis and clean cultures

## 2023-11-28 NOTE — CONSULT NOTE ADULT - ASSESSMENT
83 year old female w/ PMHx of dementia, AAOX1 - self, CVA S/P MOHAN X 3 months, presents for evaluation of rectal bleeding and diarrhea. Patient admitted to medicine and seen by GI and is s/p 1 unit prbc and scope deferred and course complicated by derlium. This is a 83 year old female w/ PMHx of dementia, AAOX1 - self, CVA S/P MOHAN X 3 months, presents for evaluation of rectal bleeding and diarrhea.  Patient is unable to provide any history and family is not available.  As per EMR, diarrhea X 2 months, and 2 weeks of BRBPR.  She was started on warfarin for unclear reasons 3 months prior while admitted for CVA at [facility], however has not had any follow up bloodwork since.  +lethargy.  +intermittent RLQ pain. presents for evaluation of rectal bleeding and diarrhea.   A chest X-ray was obtained today for infectious workup, notable for small right pleural effusion. Thoracic surgery for evaluation.

## 2023-11-28 NOTE — PROGRESS NOTE ADULT - PROVIDER SPECIALTY LIST ADULT
Gastroenterology
Gastroenterology
Internal Medicine
Hospitalist
Gastroenterology
Internal Medicine
Hospitalist

## 2023-11-28 NOTE — PROGRESS NOTE ADULT - REASON FOR ADMISSION
rectal bleeding

## 2023-11-28 NOTE — CONSULT NOTE ADULT - PROBLEM SELECTOR RECOMMENDATION 9
R pleural effusion noted on x-ray 11/27  Thoracic Consulted  POCUS showed minor effusions at the base of the R lung   Pt is asymptomatic. Denies SOB  No plans to intervene at this time R pleural effusion noted on x-ray 11/27  Thoracic Consulted  POCUS showed minor effusions at the base of the R lung   Pt is asymptomatic. Respiratory status stable  No plans to intervene at this time R pleural effusion noted on x-ray 11/27 - small  POCUS with trivial right simple pleural fluid collection with appropriate lung sliding  Patient on room air, oxygenating well, no distress noted  No indication for fluid drainage indicated at this time  Continue supportive care per primary team  Thoracic surgery will sign off, please reconsult if need arises.  Discussed/reviewed case with Dr. Vaughan

## 2023-11-28 NOTE — PROGRESS NOTE ADULT - NUTRITIONAL ASSESSMENT
This patient has been assessed with a concern for Malnutrition and has been determined to have a diagnosis/diagnoses of Moderate protein-calorie malnutrition and Underweight (BMI < 19).    This patient is being managed with:   Diet Low Fiber-  Entered: Nov 24 2023 10:50AM  

## 2023-11-28 NOTE — PROGRESS NOTE ADULT - SUBJECTIVE AND OBJECTIVE BOX
Hospitalist Progress Note    Chief Complaint:  Rectal bleeding    SUBJECTIVE / OVERNIGHT EVENTS:  No events overnight. Patient seen at bedside, in NAD, alert, calm, oriented x2. Patient denies chest pain, SOB, abd pain, N/V, fever, chills, dysuria or any other complaints. All remainder ROS negative.     MEDICATIONS  (STANDING):  azithromycin   Tablet 500 milliGRAM(s) Oral daily  cefTRIAXone Injectable. 1000 milliGRAM(s) IV Push every 24 hours  cyanocobalamin 1000 MICROGram(s) Oral daily  diphenoxylate/atropine 2 Tablet(s) Oral every 6 hours  escitalopram 10 milliGRAM(s) Oral daily  iron sucrose IVPB 200 milliGRAM(s) IV Intermittent every 24 hours  pantoprazole  Injectable 40 milliGRAM(s) IV Push daily  potassium chloride   Powder 40 milliEquivalent(s) Oral every 4 hours  QUEtiapine 50 milliGRAM(s) Oral at bedtime  traZODone 100 milliGRAM(s) Oral daily    MEDICATIONS  (PRN):  acetaminophen     Tablet .. 650 milliGRAM(s) Oral every 6 hours PRN Temp greater or equal to 38C (100.4F), Mild Pain (1 - 3)  aluminum hydroxide/magnesium hydroxide/simethicone Suspension 30 milliLiter(s) Oral every 4 hours PRN Dyspepsia  loperamide 2 milliGRAM(s) Oral every 4 hours PRN Diarrhea  melatonin 3 milliGRAM(s) Oral at bedtime PRN Insomnia  OLANZapine Injectable 2.5 milliGRAM(s) IntraMuscular every 6 hours PRN for agitation  ondansetron Injectable 4 milliGRAM(s) IV Push every 8 hours PRN Nausea and/or Vomiting        I&O's Summary    27 Nov 2023 07:01  -  28 Nov 2023 07:00  --------------------------------------------------------  IN: 240 mL / OUT: 0 mL / NET: 240 mL        PHYSICAL EXAM:  Vital Signs Last 24 Hrs  T(C): 36.7 (28 Nov 2023 09:41), Max: 37.4 (27 Nov 2023 18:19)  T(F): 98 (28 Nov 2023 09:41), Max: 99.3 (27 Nov 2023 18:19)  HR: 78 (28 Nov 2023 09:41) (60 - 105)  BP: 104/68 (28 Nov 2023 09:41) (104/68 - 153/74)  BP(mean): --  RR: 18 (28 Nov 2023 09:41) (18 - 18)  SpO2: 96% (28 Nov 2023 09:41) (94% - 98%)    Parameters below as of 28 Nov 2023 09:41  Patient On (Oxygen Delivery Method): room air          GENERAL: NAD, pleasant, AAOx3, has constructive conversation  HEENT: PERRL, +EOMI  NECK: soft, Supple, No JVD,   CHEST/LUNG: Clear to auscultation bilaterally; No wheezing  HEART: S1S2+, Regular rate and rhythm; No murmurs, rubs, or gallops  ABDOMEN: Soft, Nontender, Nondistended; Bowel sounds present  EXTREMITIES:  2+ Peripheral Pulses, restraints  SKIN: No rashes or lesions  NEURO: AAOX2, motor and sensory grossly normal  Psych: confused, irritable    LABS:                        9.3    11.79 )-----------( 248      ( 28 Nov 2023 04:55 )             28.6     11-28    136  |  100  |  12.9  ----------------------------<  103<H>  3.2<L>   |  25.0  |  0.61    Ca    7.8<L>      28 Nov 2023 04:55    TPro  4.7<L>  /  Alb  2.4<L>  /  TBili  <0.2<L>  /  DBili  x   /  AST  22  /  ALT  13  /  AlkPhos  71  11-27    PT/INR - ( 27 Nov 2023 07:13 )   PT: 12.7 sec;   INR: 1.15 ratio         PTT - ( 27 Nov 2023 07:13 )  PTT:30.6 sec      Urinalysis Basic - ( 28 Nov 2023 04:55 )    Color: x / Appearance: x / SG: x / pH: x  Gluc: 103 mg/dL / Ketone: x  / Bili: x / Urobili: x   Blood: x / Protein: x / Nitrite: x   Leuk Esterase: x / RBC: x / WBC x   Sq Epi: x / Non Sq Epi: x / Bacteria: x        CAPILLARY BLOOD GLUCOSE            RADIOLOGY & ADDITIONAL TESTS:  Results Reviewed: Y  Imaging Personally Reviewed: N  Electrocardiogram Personally Reviewed: TEJ

## 2023-11-29 VITALS
SYSTOLIC BLOOD PRESSURE: 104 MMHG | OXYGEN SATURATION: 93 % | HEART RATE: 80 BPM | DIASTOLIC BLOOD PRESSURE: 62 MMHG | RESPIRATION RATE: 16 BRPM | TEMPERATURE: 98 F

## 2023-11-29 LAB
ANION GAP SERPL CALC-SCNC: 7 MMOL/L — SIGNIFICANT CHANGE UP (ref 5–17)
ANION GAP SERPL CALC-SCNC: 7 MMOL/L — SIGNIFICANT CHANGE UP (ref 5–17)
BUN SERPL-MCNC: 16.4 MG/DL — SIGNIFICANT CHANGE UP (ref 8–20)
BUN SERPL-MCNC: 16.4 MG/DL — SIGNIFICANT CHANGE UP (ref 8–20)
CALCIUM SERPL-MCNC: 8 MG/DL — LOW (ref 8.4–10.5)
CALCIUM SERPL-MCNC: 8 MG/DL — LOW (ref 8.4–10.5)
CHLORIDE SERPL-SCNC: 104 MMOL/L — SIGNIFICANT CHANGE UP (ref 96–108)
CHLORIDE SERPL-SCNC: 104 MMOL/L — SIGNIFICANT CHANGE UP (ref 96–108)
CO2 SERPL-SCNC: 28 MMOL/L — SIGNIFICANT CHANGE UP (ref 22–29)
CO2 SERPL-SCNC: 28 MMOL/L — SIGNIFICANT CHANGE UP (ref 22–29)
CREAT SERPL-MCNC: 0.66 MG/DL — SIGNIFICANT CHANGE UP (ref 0.5–1.3)
CREAT SERPL-MCNC: 0.66 MG/DL — SIGNIFICANT CHANGE UP (ref 0.5–1.3)
EGFR: 87 ML/MIN/1.73M2 — SIGNIFICANT CHANGE UP
EGFR: 87 ML/MIN/1.73M2 — SIGNIFICANT CHANGE UP
GLUCOSE SERPL-MCNC: 98 MG/DL — SIGNIFICANT CHANGE UP (ref 70–99)
GLUCOSE SERPL-MCNC: 98 MG/DL — SIGNIFICANT CHANGE UP (ref 70–99)
HCT VFR BLD CALC: 27.9 % — LOW (ref 34.5–45)
HCT VFR BLD CALC: 27.9 % — LOW (ref 34.5–45)
HGB BLD-MCNC: 8.8 G/DL — LOW (ref 11.5–15.5)
HGB BLD-MCNC: 8.8 G/DL — LOW (ref 11.5–15.5)
MCHC RBC-ENTMCNC: 31.5 GM/DL — LOW (ref 32–36)
MCHC RBC-ENTMCNC: 31.5 GM/DL — LOW (ref 32–36)
MCHC RBC-ENTMCNC: 32.7 PG — SIGNIFICANT CHANGE UP (ref 27–34)
MCHC RBC-ENTMCNC: 32.7 PG — SIGNIFICANT CHANGE UP (ref 27–34)
MCV RBC AUTO: 103.7 FL — HIGH (ref 80–100)
MCV RBC AUTO: 103.7 FL — HIGH (ref 80–100)
PLATELET # BLD AUTO: 242 K/UL — SIGNIFICANT CHANGE UP (ref 150–400)
PLATELET # BLD AUTO: 242 K/UL — SIGNIFICANT CHANGE UP (ref 150–400)
POTASSIUM SERPL-MCNC: 4.1 MMOL/L — SIGNIFICANT CHANGE UP (ref 3.5–5.3)
POTASSIUM SERPL-MCNC: 4.1 MMOL/L — SIGNIFICANT CHANGE UP (ref 3.5–5.3)
POTASSIUM SERPL-SCNC: 4.1 MMOL/L — SIGNIFICANT CHANGE UP (ref 3.5–5.3)
POTASSIUM SERPL-SCNC: 4.1 MMOL/L — SIGNIFICANT CHANGE UP (ref 3.5–5.3)
RBC # BLD: 2.69 M/UL — LOW (ref 3.8–5.2)
RBC # BLD: 2.69 M/UL — LOW (ref 3.8–5.2)
RBC # FLD: 15.9 % — HIGH (ref 10.3–14.5)
RBC # FLD: 15.9 % — HIGH (ref 10.3–14.5)
SODIUM SERPL-SCNC: 139 MMOL/L — SIGNIFICANT CHANGE UP (ref 135–145)
SODIUM SERPL-SCNC: 139 MMOL/L — SIGNIFICANT CHANGE UP (ref 135–145)
WBC # BLD: 6.89 K/UL — SIGNIFICANT CHANGE UP (ref 3.8–10.5)
WBC # BLD: 6.89 K/UL — SIGNIFICANT CHANGE UP (ref 3.8–10.5)
WBC # FLD AUTO: 6.89 K/UL — SIGNIFICANT CHANGE UP (ref 3.8–10.5)
WBC # FLD AUTO: 6.89 K/UL — SIGNIFICANT CHANGE UP (ref 3.8–10.5)

## 2023-11-29 PROCEDURE — 99239 HOSP IP/OBS DSCHRG MGMT >30: CPT

## 2023-11-29 RX ORDER — AMLODIPINE BESYLATE 2.5 MG/1
1 TABLET ORAL
Refills: 0 | DISCHARGE

## 2023-11-29 RX ORDER — QUETIAPINE FUMARATE 200 MG/1
1 TABLET, FILM COATED ORAL
Qty: 0 | Refills: 0 | DISCHARGE
Start: 2023-11-29

## 2023-11-29 RX ORDER — DIPHENOXYLATE HCL/ATROPINE 2.5-.025MG
2 TABLET ORAL
Refills: 0 | DISCHARGE

## 2023-11-29 RX ORDER — ACETAMINOPHEN 500 MG
2 TABLET ORAL
Qty: 0 | Refills: 0 | DISCHARGE
Start: 2023-11-29

## 2023-11-29 RX ORDER — POTASSIUM CHLORIDE 20 MEQ
1 PACKET (EA) ORAL
Refills: 0 | DISCHARGE

## 2023-11-29 RX ORDER — ESCITALOPRAM OXALATE 10 MG/1
1 TABLET, FILM COATED ORAL
Qty: 0 | Refills: 0 | DISCHARGE
Start: 2023-11-29

## 2023-11-29 RX ORDER — TRAZODONE HCL 50 MG
0 TABLET ORAL
Refills: 0 | DISCHARGE

## 2023-11-29 RX ORDER — LOPERAMIDE HCL 2 MG
1 TABLET ORAL
Qty: 0 | Refills: 0 | DISCHARGE
Start: 2023-11-29

## 2023-11-29 RX ORDER — DIPHENOXYLATE HCL/ATROPINE 2.5-.025MG
2 TABLET ORAL
Qty: 0 | Refills: 0 | DISCHARGE
Start: 2023-11-29

## 2023-11-29 RX ORDER — LANOLIN ALCOHOL/MO/W.PET/CERES
1 CREAM (GRAM) TOPICAL
Qty: 0 | Refills: 0 | DISCHARGE
Start: 2023-11-29

## 2023-11-29 RX ORDER — TRAZODONE HCL 50 MG
1 TABLET ORAL
Qty: 0 | Refills: 0 | DISCHARGE
Start: 2023-11-29

## 2023-11-29 RX ORDER — ESCITALOPRAM OXALATE 10 MG/1
1 TABLET, FILM COATED ORAL
Refills: 0 | DISCHARGE

## 2023-11-29 RX ORDER — AZITHROMYCIN 500 MG/1
1 TABLET, FILM COATED ORAL
Qty: 0 | Refills: 0 | DISCHARGE
Start: 2023-11-29 | End: 2023-11-30

## 2023-11-29 RX ORDER — WARFARIN SODIUM 2.5 MG/1
1 TABLET ORAL
Refills: 0 | DISCHARGE

## 2023-11-29 RX ADMIN — Medication 2 TABLET(S): at 05:12

## 2023-11-29 RX ADMIN — ESCITALOPRAM OXALATE 10 MILLIGRAM(S): 10 TABLET, FILM COATED ORAL at 12:08

## 2023-11-29 RX ADMIN — AZITHROMYCIN 500 MILLIGRAM(S): 500 TABLET, FILM COATED ORAL at 12:09

## 2023-11-29 RX ADMIN — Medication 100 MILLIGRAM(S): at 12:08

## 2023-11-29 RX ADMIN — Medication 2 TABLET(S): at 12:09

## 2023-11-29 RX ADMIN — PREGABALIN 1000 MICROGRAM(S): 225 CAPSULE ORAL at 12:08

## 2023-11-29 RX ADMIN — Medication 2 TABLET(S): at 00:26

## 2023-11-29 RX ADMIN — PANTOPRAZOLE SODIUM 40 MILLIGRAM(S): 20 TABLET, DELAYED RELEASE ORAL at 12:08

## 2023-11-29 NOTE — DISCHARGE NOTE NURSING/CASE MANAGEMENT/SOCIAL WORK - PATIENT PORTAL LINK FT
You can access the FollowMyHealth Patient Portal offered by [facility] by registering at the following website: [email or web address]. By joining [employer]’s FollowMyHealth portal, you will also be able to view your health information using other applications (apps) compatible with our system.

## 2023-11-29 NOTE — DISCHARGE NOTE PROVIDER - DETAILS OF MALNUTRITION DIAGNOSIS/DIAGNOSES
This patient has been assessed with a concern for Malnutrition and was treated during this hospitalization for the following Nutrition diagnosis/diagnoses:     -  11/24/2023: Moderate protein-calorie malnutrition   -  11/24/2023: Underweight (BMI < 19)

## 2023-11-29 NOTE — DISCHARGE NOTE PROVIDER - PROVIDER TOKENS
PROVIDER:[TOKEN:[[id number]]],FREE:[LAST:[Primary doctor],PHONE:[(   )    -],FAX:[(   )    -]] PROVIDER:[TOKEN:[[id number]]],FREE:[LAST:[Primary doctor],PHONE:[(   )    -],FAX:[(   )    -]] PROVIDER:[TOKEN:[[id number]]],FREE:[LAST:[Primary doctor],PHONE:[(   )    -],FAX:[(   )    -]]

## 2023-11-29 NOTE — DISCHARGE NOTE PROVIDER - NSDCMRMEDTOKEN_GEN_ALL_CORE_FT
acetaminophen 325 mg oral tablet: 2 tab(s) orally every 6 hours As needed Temp greater or equal to 38C (100.4F), Mild Pain (1 - 3)  amoxicillin-clavulanate 875 mg-125 mg oral tablet: 875 milligram(s) orally 2 times a day  azithromycin 500 mg oral tablet: 1 tab(s) orally once a day  diphenoxylate-atropine 2.5 mg-0.025 mg oral tablet: 2 tab(s) orally every 6 hours  escitalopram 10 mg oral tablet: 1 tab(s) orally once a day  famotidine 10 mg oral tablet: 1 tab(s) orally once a day  loperamide 2 mg oral capsule: 1 cap(s) orally every 4 hours As needed Diarrhea  melatonin 3 mg oral tablet: 1 tab(s) orally once a day (at bedtime) As needed Insomnia  QUEtiapine 50 mg oral tablet: 1 tab(s) orally once a day (at bedtime)  traZODone 100 mg oral tablet: 1 tab(s) orally once a day

## 2023-11-29 NOTE — DISCHARGE NOTE NURSING/CASE MANAGEMENT/SOCIAL WORK - NSDCPEFALRISK_GEN_ALL_CORE
For information on Fall & Injury Prevention, visit: [email or web address] OR  [email or web address] OR  https://www.cdc.gov/steadi/patient.html

## 2023-11-29 NOTE — DISCHARGE NOTE PROVIDER - CARE PROVIDER_API CALL
Eunice Tolentino  Gastroenterology  [address], [address]  [place] [address]  Phone: [phone]  Fax: [phone]  Follow Up Time:     Primary doctor,   Phone: (   )    -  Fax: (   )    -  Follow Up Time:    Eunice Tolentino  Gastroenterology  [address], [address]  [place] [address]  Phone: [phone]  Fax: [phone]  Follow Up Time:     Primary doctor,   Phone: (   )    -  Fax: (   )    -  Follow Up Time:    Eunice Tolentino  Gastroenterology  [address], [address]  [place] [address]  Phone: [phone]  Fax: [phone]  Follow Up Time:     Primary doctor,   Phone: (   )    -  Fax: (   )    -  Follow Up Time:

## 2023-11-29 NOTE — DISCHARGE NOTE PROVIDER - NSDCCPCAREPLAN_GEN_ALL_CORE_FT
PRINCIPAL DISCHARGE DIAGNOSIS  Diagnosis: GI bleed  Assessment and Plan of Treatment: Follow up with primary doctor and GI.      SECONDARY DISCHARGE DIAGNOSES  Diagnosis: Anemia  Assessment and Plan of Treatment: Follow up with primary doctor and GI.    Diagnosis: Pleural effusion  Assessment and Plan of Treatment: Complete antibiotic course.    Diagnosis: Pneumonia  Assessment and Plan of Treatment: Complete antibiotic course.    Diagnosis: Dementia  Assessment and Plan of Treatment: Continue current medications as prescribed.

## 2023-11-29 NOTE — DISCHARGE NOTE PROVIDER - HOSPITAL COURSE
83 year old female w/ PMHx of dementia, AAOX1 - self, CVA S/P MOHAN X 3 months, presents for evaluation of rectal bleeding and diarrhea. INR noted >15.  Patient s/p K centra with improvement in INR.  Patient evaluated by GI. Patient received 1 unit prbc, scope deferred. Stool cultures negative.  Hospital course complicated by delirium, now improved. Patient's mental status waxes and wanes, discussed AC with family who decided to hold off on AC as this time.   Patient developed fever.  CXR with right pleural effusion.  Patient started on IV antibiotics.  Patient evaluated by thoracic surgery with POCUS and no need for drainage.  Blood cultures negative.  Patient evaluated by PT and recommended [facility].  Patient stable for discharge to [facility] today.     Vital Signs Last 24 Hrs  T(C): 36.7 (29 Nov 2023 09:02), Max: 36.9 (29 Nov 2023 04:47)  T(F): 98.1 (29 Nov 2023 09:02), Max: 98.5 (29 Nov 2023 04:47)  HR: 80 (29 Nov 2023 09:02) (72 - 84)  BP: 110/56 (29 Nov 2023 09:02) (102/52 - 119/63)  BP(mean): --  RR: 16 (29 Nov 2023 09:02) (16 - 18)  SpO2: 97% (29 Nov 2023 09:02) (93% - 97%)    Parameters below as of 29 Nov 2023 09:02  Patient On (Oxygen Delivery Method): room air    PHYSICAL EXAM:  GENERAL: NAD  HEAD:  Atraumatic, Normocephalic  NECK: Supple, No JVD, Normal thyroid  NERVOUS SYSTEM:  Alert & Oriented X2, forgetful, Motor Strength 5/5 B/L upper and lower extremities  CHEST/LUNG: Clear to auscultation bilaterally  HEART: Regular rate and rhythm; No murmurs, rubs, or gallops  ABDOMEN: Soft, Nontender, Nondistended; Bowel sounds present  EXTREMITIES:  2+ Peripheral Pulses, No clubbing, cyanosis, or edema  SKIN: No rashes or lesions   83 year old female w/ PMHx of dementia, AAOX1 - self, CVA S/P MOHAN X 3 months, presents for evaluation of rectal bleeding and diarrhea. INR noted >15.  Patient s/p K centra with improvement in INR.  Patient evaluated by GI. Patient received 1 unit prbc, scope deferred. Stool cultures negative.  Hospital course complicated by delirium, now improved. Patient's mental status waxes and wanes, discussed AC with family who decided to hold off on AC as this time.   Patient developed fever.  CXR with right pleural effusion.  Patient started on IV antibiotics.  Patient evaluated by thoracic surgery with POCUS and no need for drainage.  Blood cultures negative.  Patient evaluated by PT and recommended [facility].  Patient stable for discharge to [facility] today.     Vital Signs Last 24 Hrs  T(C): 36.7 (29 Nov 2023 09:02), Max: 36.9 (29 Nov 2023 04:47)  T(F): 98.1 (29 Nov 2023 09:02), Max: 98.5 (29 Nov 2023 04:47)  HR: 80 (29 Nov 2023 09:02) (72 - 84)  BP: 110/56 (29 Nov 2023 09:02) (102/52 - 119/63)  BP(mean): --  RR: 16 (29 Nov 2023 09:02) (16 - 18)  SpO2: 97% (29 Nov 2023 09:02) (93% - 97%)    Parameters below as of 29 Nov 2023 09:02  Patient On (Oxygen Delivery Method): room air    PHYSICAL EXAM:  GENERAL: NAD  HEAD:  Atraumatic, Normocephalic  NECK: Supple, No JVD, Normal thyroid  NERVOUS SYSTEM:  Alert & Oriented X2, forgetful, Motor Strength 5/5 B/L upper and lower extremities  CHEST/LUNG: Clear to auscultation bilaterally  HEART: Regular rate and rhythm; No murmurs, rubs, or gallops  ABDOMEN: Soft, Nontender, Nondistended; Bowel sounds present  EXTREMITIES:  2+ Peripheral Pulses, No clubbing, cyanosis, or edema  SKIN: No rashes or lesions   83 year old female w/ PMHx of dementia, AAOX1 - self, CVA S/P MOHAN X 3 months, presents for evaluation of rectal bleeding and diarrhea. INR noted >15.  Patient s/p K centra with improvement in INR.  Patient evaluated by GI. Patient received 1 unit prbc, scope deferred. Stool cultures negative.  Hospital course complicated by delirium, now improved. Patient's mental status waxes and wanes, discussed AC with family who decided to hold off on AC as this time.   Patient developed fever.  CXR with right pleural effusion.  Patient started on IV antibiotics.  Patient evaluated by thoracic surgery with POCUS and no need for drainage.  Blood cultures negative.  Patient evaluated by PT and recommended [facility].  Patient stable for discharge to [facility] today.     Vital Signs Last 24 Hrs  T(C): 36.7 (29 Nov 2023 09:02), Max: 36.9 (29 Nov 2023 04:47)  T(F): 98.1 (29 Nov 2023 09:02), Max: 98.5 (29 Nov 2023 04:47)  HR: 80 (29 Nov 2023 09:02) (72 - 84)  BP: 110/56 (29 Nov 2023 09:02) (102/52 - 119/63)  BP(mean): --  RR: 16 (29 Nov 2023 09:02) (16 - 18)  SpO2: 97% (29 Nov 2023 09:02) (93% - 97%)    Parameters below as of 29 Nov 2023 09:02  Patient On (Oxygen Delivery Method): room air    PHYSICAL EXAM:  GENERAL: NAD  HEAD:  Atraumatic, Normocephalic  NECK: Supple, No JVD, Normal thyroid  NERVOUS SYSTEM:  Alert & Oriented X2, forgetful, Motor Strength 5/5 B/L upper and lower extremities  CHEST/LUNG: Clear to auscultation bilaterally  HEART: Regular rate and rhythm; No murmurs, rubs, or gallops  ABDOMEN: Soft, Nontender, Nondistended; Bowel sounds present  EXTREMITIES:  2+ Peripheral Pulses, No clubbing, cyanosis, or edema  SKIN: No rashes or lesions

## 2023-12-02 LAB
CULTURE RESULTS: SIGNIFICANT CHANGE UP
SPECIMEN SOURCE: SIGNIFICANT CHANGE UP

## 2023-12-13 PROCEDURE — 83540 ASSAY OF IRON: CPT

## 2023-12-13 PROCEDURE — 82607 VITAMIN B-12: CPT

## 2023-12-13 PROCEDURE — 36415 COLL VENOUS BLD VENIPUNCTURE: CPT

## 2023-12-13 PROCEDURE — 83605 ASSAY OF LACTIC ACID: CPT

## 2023-12-13 PROCEDURE — 85025 COMPLETE CBC W/AUTO DIFF WBC: CPT

## 2023-12-13 PROCEDURE — 93005 ELECTROCARDIOGRAM TRACING: CPT

## 2023-12-13 PROCEDURE — 80048 BASIC METABOLIC PNL TOTAL CA: CPT

## 2023-12-13 PROCEDURE — 86923 COMPATIBILITY TEST ELECTRIC: CPT

## 2023-12-13 PROCEDURE — 83993 ASSAY FOR CALPROTECTIN FECAL: CPT

## 2023-12-13 PROCEDURE — P9040: CPT

## 2023-12-13 PROCEDURE — 84145 PROCALCITONIN (PCT): CPT

## 2023-12-13 PROCEDURE — 83735 ASSAY OF MAGNESIUM: CPT

## 2023-12-13 PROCEDURE — 87507 IADNA-DNA/RNA PROBE TQ 12-25: CPT

## 2023-12-13 PROCEDURE — 85027 COMPLETE CBC AUTOMATED: CPT

## 2023-12-13 PROCEDURE — 36430 TRANSFUSION BLD/BLD COMPNT: CPT

## 2023-12-13 PROCEDURE — 87177 OVA AND PARASITES SMEARS: CPT

## 2023-12-13 PROCEDURE — 99285 EMERGENCY DEPT VISIT HI MDM: CPT

## 2023-12-13 PROCEDURE — 84443 ASSAY THYROID STIM HORMONE: CPT

## 2023-12-13 PROCEDURE — 86258 DGP ANTIBODY EACH IG CLASS: CPT

## 2023-12-13 PROCEDURE — 84100 ASSAY OF PHOSPHORUS: CPT

## 2023-12-13 PROCEDURE — 87046 STOOL CULTR AEROBIC BACT EA: CPT

## 2023-12-13 PROCEDURE — 86900 BLOOD TYPING SEROLOGIC ABO: CPT

## 2023-12-13 PROCEDURE — 86364 TISS TRNSGLTMNASE EA IG CLAS: CPT

## 2023-12-13 PROCEDURE — 85610 PROTHROMBIN TIME: CPT

## 2023-12-13 PROCEDURE — 86901 BLOOD TYPING SEROLOGIC RH(D): CPT

## 2023-12-13 PROCEDURE — 80053 COMPREHEN METABOLIC PANEL: CPT

## 2023-12-13 PROCEDURE — 85730 THROMBOPLASTIN TIME PARTIAL: CPT

## 2023-12-13 PROCEDURE — 83550 IRON BINDING TEST: CPT

## 2023-12-13 PROCEDURE — 82728 ASSAY OF FERRITIN: CPT

## 2023-12-13 PROCEDURE — 97163 PT EVAL HIGH COMPLEX 45 MIN: CPT

## 2023-12-13 PROCEDURE — 82746 ASSAY OF FOLIC ACID SERUM: CPT

## 2023-12-13 PROCEDURE — 87040 BLOOD CULTURE FOR BACTERIA: CPT

## 2023-12-13 PROCEDURE — 86850 RBC ANTIBODY SCREEN: CPT

## 2023-12-13 PROCEDURE — 87493 C DIFF AMPLIFIED PROBE: CPT

## 2023-12-13 PROCEDURE — 71045 X-RAY EXAM CHEST 1 VIEW: CPT

## 2023-12-13 PROCEDURE — 86231 EMA EACH IG CLASS: CPT

## 2023-12-13 PROCEDURE — 82653 EL-1 FECAL QUANTITATIVE: CPT

## 2023-12-13 PROCEDURE — 87045 FECES CULTURE AEROBIC BACT: CPT

## 2023-12-13 PROCEDURE — 84466 ASSAY OF TRANSFERRIN: CPT

## 2023-12-13 PROCEDURE — 86256 FLUORESCENT ANTIBODY TITER: CPT

## 2024-02-26 ENCOUNTER — EMERGENCY (EMERGENCY)
Facility: HOSPITAL | Age: 84
LOS: 1 days | Discharge: HOME | End: 2024-02-26
Attending: STUDENT IN AN ORGANIZED HEALTH CARE EDUCATION/TRAINING PROGRAM
Payer: MEDICARE

## 2024-02-26 VITALS
SYSTOLIC BLOOD PRESSURE: 120 MMHG | RESPIRATION RATE: 18 BRPM | TEMPERATURE: 98 F | HEART RATE: 73 BPM | OXYGEN SATURATION: 97 % | DIASTOLIC BLOOD PRESSURE: 65 MMHG

## 2024-02-26 PROBLEM — F03.90 UNSPECIFIED DEMENTIA WITHOUT BEHAVIORAL DISTURBANCE: Chronic | Status: ACTIVE | Noted: 2023-11-21

## 2024-02-26 PROBLEM — I63.9 CEREBRAL INFARCTION, UNSPECIFIED: Chronic | Status: ACTIVE | Noted: 2023-11-21

## 2024-02-26 LAB
ALBUMIN SERPL ELPH-MCNC: 3 G/DL — LOW (ref 3.3–5.2)
ALP SERPL-CCNC: 84 U/L — SIGNIFICANT CHANGE UP (ref 40–120)
ALT FLD-CCNC: 12 U/L — SIGNIFICANT CHANGE UP
ANION GAP SERPL CALC-SCNC: 14 MMOL/L — SIGNIFICANT CHANGE UP (ref 5–17)
APPEARANCE UR: ABNORMAL
APTT BLD: 27.2 SEC — SIGNIFICANT CHANGE UP (ref 24.5–35.6)
AST SERPL-CCNC: 22 U/L — SIGNIFICANT CHANGE UP
BACTERIA # UR AUTO: ABNORMAL /HPF
BASOPHILS # BLD AUTO: 0.04 K/UL — SIGNIFICANT CHANGE UP (ref 0–0.2)
BASOPHILS NFR BLD AUTO: 0.7 % — SIGNIFICANT CHANGE UP (ref 0–2)
BILIRUB SERPL-MCNC: 0.2 MG/DL — LOW (ref 0.4–2)
BILIRUB UR-MCNC: NEGATIVE — SIGNIFICANT CHANGE UP
BUN SERPL-MCNC: 17.2 MG/DL — SIGNIFICANT CHANGE UP (ref 8–20)
CALCIUM SERPL-MCNC: 8 MG/DL — LOW (ref 8.4–10.5)
CAST: 17 /LPF — HIGH (ref 0–4)
CHLORIDE SERPL-SCNC: 103 MMOL/L — SIGNIFICANT CHANGE UP (ref 96–108)
CK MB CFR SERPL CALC: 6.1 NG/ML — SIGNIFICANT CHANGE UP (ref 0–6.7)
CK SERPL-CCNC: 373 U/L — HIGH (ref 25–170)
CO2 SERPL-SCNC: 24 MMOL/L — SIGNIFICANT CHANGE UP (ref 22–29)
COLOR SPEC: YELLOW — SIGNIFICANT CHANGE UP
CREAT SERPL-MCNC: 0.73 MG/DL — SIGNIFICANT CHANGE UP (ref 0.5–1.3)
DIFF PNL FLD: ABNORMAL
EGFR: 82 ML/MIN/1.73M2 — SIGNIFICANT CHANGE UP
EOSINOPHIL # BLD AUTO: 0.04 K/UL — SIGNIFICANT CHANGE UP (ref 0–0.5)
EOSINOPHIL NFR BLD AUTO: 0.7 % — SIGNIFICANT CHANGE UP (ref 0–6)
GLUCOSE SERPL-MCNC: 106 MG/DL — HIGH (ref 70–99)
GLUCOSE UR QL: NEGATIVE MG/DL — SIGNIFICANT CHANGE UP
HCT VFR BLD CALC: 40.8 % — SIGNIFICANT CHANGE UP (ref 34.5–45)
HGB BLD-MCNC: 13.8 G/DL — SIGNIFICANT CHANGE UP (ref 11.5–15.5)
IMM GRANULOCYTES NFR BLD AUTO: 0.3 % — SIGNIFICANT CHANGE UP (ref 0–0.9)
INR BLD: 0.97 RATIO — SIGNIFICANT CHANGE UP (ref 0.85–1.18)
KETONES UR-MCNC: ABNORMAL MG/DL
LACTATE BLDV-MCNC: 1 MMOL/L — SIGNIFICANT CHANGE UP (ref 0.5–2)
LEUKOCYTE ESTERASE UR-ACNC: ABNORMAL
LIDOCAIN IGE QN: 8 U/L — LOW (ref 22–51)
LYMPHOCYTES # BLD AUTO: 0.55 K/UL — LOW (ref 1–3.3)
LYMPHOCYTES # BLD AUTO: 9 % — LOW (ref 13–44)
MCHC RBC-ENTMCNC: 32 PG — SIGNIFICANT CHANGE UP (ref 27–34)
MCHC RBC-ENTMCNC: 33.8 GM/DL — SIGNIFICANT CHANGE UP (ref 32–36)
MCV RBC AUTO: 94.7 FL — SIGNIFICANT CHANGE UP (ref 80–100)
MONOCYTES # BLD AUTO: 0.48 K/UL — SIGNIFICANT CHANGE UP (ref 0–0.9)
MONOCYTES NFR BLD AUTO: 7.8 % — SIGNIFICANT CHANGE UP (ref 2–14)
NEUTROPHILS # BLD AUTO: 5.01 K/UL — SIGNIFICANT CHANGE UP (ref 1.8–7.4)
NEUTROPHILS NFR BLD AUTO: 81.5 % — HIGH (ref 43–77)
NITRITE UR-MCNC: NEGATIVE — SIGNIFICANT CHANGE UP
PH UR: 6.5 — SIGNIFICANT CHANGE UP (ref 5–8)
PLATELET # BLD AUTO: 254 K/UL — SIGNIFICANT CHANGE UP (ref 150–400)
POTASSIUM SERPL-MCNC: 3.6 MMOL/L — SIGNIFICANT CHANGE UP (ref 3.5–5.3)
POTASSIUM SERPL-SCNC: 3.6 MMOL/L — SIGNIFICANT CHANGE UP (ref 3.5–5.3)
PROT SERPL-MCNC: 6.6 G/DL — SIGNIFICANT CHANGE UP (ref 6.6–8.7)
PROT UR-MCNC: 100 MG/DL
PROTHROM AB SERPL-ACNC: 10.8 SEC — SIGNIFICANT CHANGE UP (ref 9.5–13)
RBC # BLD: 4.31 M/UL — SIGNIFICANT CHANGE UP (ref 3.8–5.2)
RBC # FLD: 12.9 % — SIGNIFICANT CHANGE UP (ref 10.3–14.5)
RBC CASTS # UR COMP ASSIST: 4 /HPF — SIGNIFICANT CHANGE UP (ref 0–4)
SODIUM SERPL-SCNC: 141 MMOL/L — SIGNIFICANT CHANGE UP (ref 135–145)
SP GR SPEC: 1.01 — SIGNIFICANT CHANGE UP (ref 1–1.03)
SQUAMOUS # UR AUTO: 6 /HPF — HIGH (ref 0–5)
UROBILINOGEN FLD QL: 0.2 MG/DL — SIGNIFICANT CHANGE UP (ref 0.2–1)
WBC # BLD: 6.14 K/UL — SIGNIFICANT CHANGE UP (ref 3.8–10.5)
WBC # FLD AUTO: 6.14 K/UL — SIGNIFICANT CHANGE UP (ref 3.8–10.5)
WBC UR QL: >998 /HPF — HIGH (ref 0–5)

## 2024-02-26 PROCEDURE — 99223 1ST HOSP IP/OBS HIGH 75: CPT | Mod: FS

## 2024-02-26 PROCEDURE — 70450 CT HEAD/BRAIN W/O DYE: CPT | Mod: 26,MC

## 2024-02-26 PROCEDURE — 73564 X-RAY EXAM KNEE 4 OR MORE: CPT | Mod: 26,50

## 2024-02-26 PROCEDURE — 72192 CT PELVIS W/O DYE: CPT | Mod: 26,MC

## 2024-02-26 PROCEDURE — 73522 X-RAY EXAM HIPS BI 3-4 VIEWS: CPT | Mod: 26

## 2024-02-26 PROCEDURE — 71045 X-RAY EXAM CHEST 1 VIEW: CPT | Mod: 26

## 2024-02-26 PROCEDURE — 73552 X-RAY EXAM OF FEMUR 2/>: CPT | Mod: 26,LT,76

## 2024-02-26 PROCEDURE — 72125 CT NECK SPINE W/O DYE: CPT | Mod: 26,MC

## 2024-02-26 PROCEDURE — 93970 EXTREMITY STUDY: CPT | Mod: 26

## 2024-02-26 RX ORDER — ESCITALOPRAM OXALATE 10 MG/1
10 TABLET, FILM COATED ORAL DAILY
Refills: 0 | Status: DISCONTINUED | OUTPATIENT
Start: 2024-02-26 | End: 2024-03-04

## 2024-02-26 RX ORDER — SODIUM CHLORIDE 9 MG/ML
1000 INJECTION INTRAMUSCULAR; INTRAVENOUS; SUBCUTANEOUS ONCE
Refills: 0 | Status: COMPLETED | OUTPATIENT
Start: 2024-02-26 | End: 2024-02-26

## 2024-02-26 RX ORDER — CEFTRIAXONE 500 MG/1
1000 INJECTION, POWDER, FOR SOLUTION INTRAMUSCULAR; INTRAVENOUS ONCE
Refills: 0 | Status: COMPLETED | OUTPATIENT
Start: 2024-02-26 | End: 2024-02-26

## 2024-02-26 RX ORDER — TRAZODONE HCL 50 MG
100 TABLET ORAL AT BEDTIME
Refills: 0 | Status: DISCONTINUED | OUTPATIENT
Start: 2024-02-26 | End: 2024-03-04

## 2024-02-26 RX ORDER — CEFTRIAXONE 500 MG/1
1000 INJECTION, POWDER, FOR SOLUTION INTRAMUSCULAR; INTRAVENOUS EVERY 24 HOURS
Refills: 0 | Status: DISCONTINUED | OUTPATIENT
Start: 2024-02-26 | End: 2024-03-04

## 2024-02-26 RX ORDER — CEFTRIAXONE 500 MG/1
1000 INJECTION, POWDER, FOR SOLUTION INTRAMUSCULAR; INTRAVENOUS ONCE
Refills: 0 | Status: DISCONTINUED | OUTPATIENT
Start: 2024-02-26 | End: 2024-02-26

## 2024-02-26 RX ORDER — ACETAMINOPHEN 500 MG
1000 TABLET ORAL ONCE
Refills: 0 | Status: COMPLETED | OUTPATIENT
Start: 2024-02-26 | End: 2024-02-26

## 2024-02-26 RX ORDER — ACETAMINOPHEN 500 MG
1000 TABLET ORAL ONCE
Refills: 0 | Status: DISCONTINUED | OUTPATIENT
Start: 2024-02-26 | End: 2024-02-27

## 2024-02-26 RX ORDER — CEFTRIAXONE 500 MG/1
1000 INJECTION, POWDER, FOR SOLUTION INTRAMUSCULAR; INTRAVENOUS EVERY 24 HOURS
Refills: 0 | Status: DISCONTINUED | OUTPATIENT
Start: 2024-02-26 | End: 2024-02-26

## 2024-02-26 RX ORDER — METHOCARBAMOL 500 MG/1
1000 TABLET, FILM COATED ORAL ONCE
Refills: 0 | Status: COMPLETED | OUTPATIENT
Start: 2024-02-26 | End: 2024-02-26

## 2024-02-26 RX ORDER — QUETIAPINE FUMARATE 200 MG/1
25 TABLET, FILM COATED ORAL EVERY 8 HOURS
Refills: 0 | Status: DISCONTINUED | OUTPATIENT
Start: 2024-02-26 | End: 2024-03-04

## 2024-02-26 RX ORDER — FAMOTIDINE 10 MG/ML
20 INJECTION INTRAVENOUS
Refills: 0 | Status: DISCONTINUED | OUTPATIENT
Start: 2024-02-26 | End: 2024-03-04

## 2024-02-26 RX ADMIN — CEFTRIAXONE 1000 MILLIGRAM(S): 500 INJECTION, POWDER, FOR SOLUTION INTRAMUSCULAR; INTRAVENOUS at 18:52

## 2024-02-26 RX ADMIN — QUETIAPINE FUMARATE 25 MILLIGRAM(S): 200 TABLET, FILM COATED ORAL at 22:18

## 2024-02-26 RX ADMIN — CEFTRIAXONE 1000 MILLIGRAM(S): 500 INJECTION, POWDER, FOR SOLUTION INTRAMUSCULAR; INTRAVENOUS at 16:37

## 2024-02-26 RX ADMIN — Medication 1000 MILLIGRAM(S): at 22:45

## 2024-02-26 RX ADMIN — SODIUM CHLORIDE 1000 MILLILITER(S): 9 INJECTION INTRAMUSCULAR; INTRAVENOUS; SUBCUTANEOUS at 09:16

## 2024-02-26 RX ADMIN — Medication 400 MILLIGRAM(S): at 16:37

## 2024-02-26 RX ADMIN — SODIUM CHLORIDE 1000 MILLILITER(S): 9 INJECTION INTRAMUSCULAR; INTRAVENOUS; SUBCUTANEOUS at 10:24

## 2024-02-26 RX ADMIN — Medication 100 MILLIGRAM(S): at 22:18

## 2024-02-26 NOTE — ED PROVIDER NOTE - PHYSICAL EXAMINATION
General: Awake, alert, lying in bed in NAD  HEENT: Normocephalic, atraumatic. No scleral icterus or conjunctival injection. EOMI. Moist mucous membranes. Oropharynx clear.   Neck:. Soft and supple.  Cardiac: RRR, Peripheral pulses 2+ and symmetric. No LE edema.  Resp: Lungs CTAB. No accessory muscle use  Abd: Soft, non-tender, non-distended. No guarding, rebound, or rigidity.  Back: Spine midline and non-tender.   Skin: No rashes, abrasions, or lacerations.  Neuro: AO x 1. Knee movement limited to 30 degrees b/l 2/2 to pain. Motor strength and sensation grossly intact.  Psych: Appropriate mood and affect

## 2024-02-26 NOTE — ED PROVIDER NOTE - ATTENDING CONTRIBUTION TO CARE
I personally saw the patient with the resident, and completed the key components of the history and physical exam. I then discussed the management plan with the resident.    83-year-old female with past medical history dementia, CVA presents for a fall 3 weeks ago with right knee pain and left hip pain. Patient has been decreasingly ambulatory over the past few weeks. Patient is a poor historian, unknown head trauma, but has been at her normal mental status. Per daughter, patient often fabricates stories - has a home health aide, lives with daughter, ambulates with walker at baseline.    PE - NAD, A&O x 2-2.5, no facial trauma, no midline cervical TTP, full ROM neck, chest wall stable, pelvis stable, RRR, lungs CTA B/L, abd soft NT/ND, no midline thoracic or lumbar TTP, decreased ROM left hip and right knee (no deformities), strength/sensation intact x 4 extremities, 2+ symmetrical distal pulses, no LE edema.    labs, CT, XR, pain control - patient found to have UTI, no fracture/dislocation - will place in observation for PT consult.

## 2024-02-26 NOTE — ED CDU PROVIDER INITIAL DAY NOTE - ATTENDING APP SHARED VISIT CONTRIBUTION OF CARE
I, Michael Allen MD, have seen and examined the patient on the date of service.  I agree with the KIRSTEN's assessment as written, with exceptions or additions as noted below or in a separate note.  Patient with a history of dementia, prior stroke is presenting with concern for falls and generalized weakness with difficulty with ambulation.  Here, patient with no acute complaints or distress.  Found to have UTI, given ceftriaxone.  On imaging has evidence of old stroke but no new traumatic pathology.  Will admit to observation at this time for treatment of UTI, physical therapy evaluation for possible subacute rehab.  See PA MDM for more specific

## 2024-02-26 NOTE — ED PROVIDER NOTE - CLINICAL SUMMARY MEDICAL DECISION MAKING FREE TEXT BOX
83 year old female w/ PMHx of dementia, AAOX1 - self, CVA S/P MOHAN X 3 months, BIBEMS from home; c/o R knee pain.    Pending CT head and neck, xrays of hips, pelvis, femur, knee, lab work, UA/UC, [facility] for pain, CK.  Will reassess.

## 2024-02-26 NOTE — ED CDU PROVIDER INITIAL DAY NOTE - NSICDXPASTMEDICALHX_GEN_ALL_CORE_FT
Please call pts daughter who is POA.   Rupa's ultrasound of the right leg shows a chronic deep vein thrombosis similar to prior test. No acute thrombosis is seen. It is all stable.  I recommend she stay off the blood thinner due to her history of having the GI bleed.        PAST MEDICAL HISTORY:  CVA (cerebrovascular accident)     Dementia

## 2024-02-26 NOTE — ED ADULT NURSE REASSESSMENT NOTE - NS ED NURSE REASSESS COMMENT FT1
pt resting comfortably in bed, breathing even and unlabored, no changes in mentation. pending CT hip.

## 2024-02-26 NOTE — ED ADULT NURSE REASSESSMENT NOTE - NS ED NURSE REASSESS COMMENT FT1
Pt baseline mental status, no apparent distress noted at this time. Pt handed off to RN in stable condition.

## 2024-02-26 NOTE — ED ADULT TRIAGE NOTE - CHIEF COMPLAINT QUOTE
biba home; c/o increased back pain, weakness ; " per EMS pt daughter stated pt had a fall 3 weeks ago . pt a&ox1 poor historian, pt baseline a&ox2. unk anticoag use/head trauma/events leading to ED visit

## 2024-02-26 NOTE — ED CDU PROVIDER INITIAL DAY NOTE - CLINICAL SUMMARY MEDICAL DECISION MAKING FREE TEXT BOX
83 year old female w/ PMHx of dementia, AAOX1 - self, CVA S/P MOHAN X 3 months, BIBEMS from home; c/o R knee pain. Per EMS, daughter stated that the patient had fall in home/bath tub 3 weeks ago. Per phone conversation w/ daughter, daughter states HHA daily 4hrs, pt was ambulatory w/ a walker Friday, was not ambulatory Saturday onward. No one witnessed fall, pt poor historian.   - keep the patient on observation for PT evaluation  - continue home medication  - fall precaution  - will obtain a DVT study of the lower extremity  - obtain the MRI of the brain due to the abnormal finding on the CT scan/ family concern  - case management 83 year old female w/ PMHx of dementia, AAOX1 - self, CVA S/P OMHAN X 3 months, BIBEMS from home; c/o R knee pain. Per EMS, daughter stated that the patient had fall in home/bath tub 3 weeks ago. Per phone conversation w/ daughter, daughter states HHA daily 4hrs, pt was ambulatory w/ a walker Friday, was not ambulatory Saturday onward. No one witnessed fall, pt poor historian.   - keep the patient on observation for PT evaluation  - continue home medication  - fall precaution  - will obtain a DVT study of the lower extremity  - obtain the MRI of the brain due to the abnormal finding on the CT scan/ family concern  - case management  -UA consistent with UTI  continue ceftriaxone daily x 3

## 2024-02-26 NOTE — ED ADULT NURSE NOTE - NSFALLHARMRISKINTERV_ED_ALL_ED
Assistance OOB with selected safe patient handling equipment if applicable/Assistance with ambulation/Communicate risk of Fall with Harm to all staff, patient, and family/Monitor gait and stability/Monitor for mental status changes and reorient to person, place, and time, as needed/Provide visual cue: red socks, yellow wristband, yellow gown, etc/Reinforce activity limits and safety measures with patient and family/Toileting schedule using arm’s reach rule for commode and bathroom/Use of alarms - bed, stretcher, chair and/or video monitoring/Bed in lowest position, wheels locked, appropriate side rails in place/Call bell, personal items and telephone in reach/Instruct patient to call for assistance before getting out of bed/chair/stretcher/Non-slip footwear applied when patient is off stretcher/[place] to call system/Physically safe environment - no spills, clutter or unnecessary equipment/Purposeful Proactive Rounding/Room/bathroom lighting operational, light cord in reach

## 2024-02-26 NOTE — ED CDU PROVIDER INITIAL DAY NOTE - OBJECTIVE STATEMENT
83 year old female w/ PMHx of dementia, AAOX1 - self, CVA S/P MOHAN X 3 months, BIBEMS from home; c/o R knee pain. Per EMS, daughter stated that the patient had fall in home/bath tub 3 weeks ago. Per phone conversation w/ daughter, daughter states HHA daily 4hrs, pt was ambulatory w/ a walker Friday, was not ambulatory Saturday onward. No one witnessed fall, pt poor historian. Unknown anti coag use/head trauma/other events leading to ED visit.

## 2024-02-26 NOTE — ED PROVIDER NOTE - CARE PLAN
Principal Discharge DX:	Fall  Secondary Diagnosis:	Acute UTI  Secondary Diagnosis:	Bacterial UTI   1 Principal Discharge DX:	Acute UTI  Secondary Diagnosis:	Bacterial UTI  Secondary Diagnosis:	Fall

## 2024-02-26 NOTE — ED CDU PROVIDER INITIAL DAY NOTE - PHYSICAL EXAMINATION
Const: AOX1 nontoxic appearing, no apparent respiratory or physical distress.   HEENT: NC/AT. Moist mucous membranes.  Eyes: JAIMIE. EOMI  Neck: Soft and supple. Full ROM without pain.  Cardiac: Regular rate and regular rhythm. +S1/S2. + murmurs. Peripheral pulses 1-2+  mild left lower extremity is a swollen more than right  Resp: Speaking in full sentences. No evidence of respiratory distress. No wheezes, rales or rhonchi. No adventitious breath sounds   Abd: Soft, non-tender, non-distended. Normal bowel sounds in all 4 quadrants. No guarding or rebound.  Back: Spine midline and non-tender. No CVAT.  Skin: No rashes, abrasions or lacerations.  Neuro: Awake, alert & oriented x 3. Moves all extremities symmetrically.

## 2024-02-26 NOTE — ED ADULT NURSE NOTE - OBJECTIVE STATEMENT
Assumed pt care at 0818. Pt AA&Ox1, baseline AA&Ox2 as per EMS, resp even/unlabored, MAEx4, NAD. Pt BIBEMS with c/o back pain, increased weakness, EMS reports daughter states pt has recent fall 3 weeks ago. Pt poor historian, unknown series of events prior to ED visit.

## 2024-02-26 NOTE — ED ADULT NURSE REASSESSMENT NOTE - NS ED NURSE REASSESS COMMENT FT1
Assumed care of pt at 2300.  Pt connected to cardiac monitor-NSR HR 88 on room air. VSS. Pt AOx2 to person and place. Airway patent respirations even unlabored. Permafit in place. Incontinence care provided. Bed locked lowest position. Assumed care of pt at 2300.  Pt connected to cardiac monitor-NSR HR 88 on room air. VSS. Pt AOx2 to person and place. Airway patent respirations even unlabored. Permafit in place. Incontinence care provided. Bed locked lowest position. Pt awaiting MRI.

## 2024-02-26 NOTE — PHYSICAL THERAPY INITIAL EVALUATION ADULT - ADDITIONAL COMMENTS
Pt poor historian: Pt reports living with spouse and daughter in a house +BELLE and amb with RW independent.

## 2024-02-26 NOTE — ED PROVIDER NOTE - OBJECTIVE STATEMENT
83 year old female w/ PMHx of dementia, AAOX1 - self, CVA S/P MOHAN X 3 months, BIBEMS from home; c/o R knee pain. Per EMS, daughter stated that the patient had fall in home/bath tub 3 weeks ago. Per phone conversation w/ daughter, daughter states HHA daily 4hrs, pt was ambulatory w/ a walker Friday, was not ambulatory Saturday onward. No one witnessed fall, pt poor historian. Unknown anticoag use/head trauma/other events leading to ED visit.

## 2024-02-26 NOTE — PHYSICAL THERAPY INITIAL EVALUATION ADULT - PERTINENT HX OF CURRENT PROBLEM, REHAB EVAL
83 year old female w/ PMHx of dementia, AAOX1 - self, CVA S/P MOHAN X 3 months, BIBEMS from home; c/o R knee pain. Per EMS, daughter stated that the patient had fall in home/bath tub 3 weeks ago. Per phone conversation w/ daughter, daughter states HHA daily 4hrs, pt was ambulatory w/ a walker Friday, was not ambulatory Saturday onward. No one witnessed fall, pt poor historian. UA consistent with UTI

## 2024-02-26 NOTE — ED ADULT NURSE REASSESSMENT NOTE - NS ED NURSE REASSESS COMMENT FT1
pt resting comfortably in bed, breathing even and unlabored, no complaints of pain. pending GI PCR and CDiff PCR, no BMs throughout shift.

## 2024-02-26 NOTE — ED ADULT NURSE REASSESSMENT NOTE - NS ED NURSE REASSESS COMMENT FT1
Report received from QUINTON Poole at 11:15am, pt AOx1, complains of lower abdominal pain and neck pain, pt laying comfortably in bed with no complaints, safety maintained. pending urine and CT.

## 2024-02-27 VITALS
TEMPERATURE: 98 F | HEART RATE: 88 BPM | DIASTOLIC BLOOD PRESSURE: 73 MMHG | SYSTOLIC BLOOD PRESSURE: 188 MMHG | OXYGEN SATURATION: 96 % | RESPIRATION RATE: 18 BRPM

## 2024-02-27 PROCEDURE — 82550 ASSAY OF CK (CPK): CPT

## 2024-02-27 PROCEDURE — 96376 TX/PRO/DX INJ SAME DRUG ADON: CPT

## 2024-02-27 PROCEDURE — 99239 HOSP IP/OBS DSCHRG MGMT >30: CPT

## 2024-02-27 PROCEDURE — 93970 EXTREMITY STUDY: CPT

## 2024-02-27 PROCEDURE — 85025 COMPLETE CBC W/AUTO DIFF WBC: CPT

## 2024-02-27 PROCEDURE — 96365 THER/PROPH/DIAG IV INF INIT: CPT

## 2024-02-27 PROCEDURE — 85610 PROTHROMBIN TIME: CPT

## 2024-02-27 PROCEDURE — 70551 MRI BRAIN STEM W/O DYE: CPT | Mod: MC

## 2024-02-27 PROCEDURE — 96361 HYDRATE IV INFUSION ADD-ON: CPT

## 2024-02-27 PROCEDURE — 85730 THROMBOPLASTIN TIME PARTIAL: CPT

## 2024-02-27 PROCEDURE — 70450 CT HEAD/BRAIN W/O DYE: CPT | Mod: MC

## 2024-02-27 PROCEDURE — 96375 TX/PRO/DX INJ NEW DRUG ADDON: CPT

## 2024-02-27 PROCEDURE — 73564 X-RAY EXAM KNEE 4 OR MORE: CPT

## 2024-02-27 PROCEDURE — 71045 X-RAY EXAM CHEST 1 VIEW: CPT

## 2024-02-27 PROCEDURE — 96372 THER/PROPH/DIAG INJ SC/IM: CPT | Mod: XU

## 2024-02-27 PROCEDURE — 87077 CULTURE AEROBIC IDENTIFY: CPT

## 2024-02-27 PROCEDURE — 83605 ASSAY OF LACTIC ACID: CPT

## 2024-02-27 PROCEDURE — 70551 MRI BRAIN STEM W/O DYE: CPT | Mod: 26,MC

## 2024-02-27 PROCEDURE — 82962 GLUCOSE BLOOD TEST: CPT

## 2024-02-27 PROCEDURE — 87186 SC STD MICRODIL/AGAR DIL: CPT

## 2024-02-27 PROCEDURE — 83690 ASSAY OF LIPASE: CPT

## 2024-02-27 PROCEDURE — 87086 URINE CULTURE/COLONY COUNT: CPT

## 2024-02-27 PROCEDURE — 36415 COLL VENOUS BLD VENIPUNCTURE: CPT

## 2024-02-27 PROCEDURE — 81001 URINALYSIS AUTO W/SCOPE: CPT

## 2024-02-27 PROCEDURE — 99285 EMERGENCY DEPT VISIT HI MDM: CPT | Mod: 25

## 2024-02-27 PROCEDURE — 73552 X-RAY EXAM OF FEMUR 2/>: CPT

## 2024-02-27 PROCEDURE — 72125 CT NECK SPINE W/O DYE: CPT | Mod: MC

## 2024-02-27 PROCEDURE — G0378: CPT

## 2024-02-27 PROCEDURE — 82553 CREATINE MB FRACTION: CPT

## 2024-02-27 PROCEDURE — 72192 CT PELVIS W/O DYE: CPT | Mod: MC

## 2024-02-27 PROCEDURE — 80053 COMPREHEN METABOLIC PANEL: CPT

## 2024-02-27 PROCEDURE — 73522 X-RAY EXAM HIPS BI 3-4 VIEWS: CPT

## 2024-02-27 RX ORDER — OLANZAPINE 15 MG/1
5 TABLET, FILM COATED ORAL ONCE
Refills: 0 | Status: COMPLETED | OUTPATIENT
Start: 2024-02-27 | End: 2024-02-27

## 2024-02-27 RX ORDER — ACETAMINOPHEN 500 MG
1000 TABLET ORAL ONCE
Refills: 0 | Status: COMPLETED | OUTPATIENT
Start: 2024-02-27 | End: 2024-02-27

## 2024-02-27 RX ORDER — CEFPODOXIME PROXETIL 100 MG
1 TABLET ORAL
Qty: 10 | Refills: 0
Start: 2024-02-27 | End: 2024-03-02

## 2024-02-27 RX ADMIN — Medication 1000 MILLIGRAM(S): at 05:41

## 2024-02-27 RX ADMIN — OLANZAPINE 5 MILLIGRAM(S): 15 TABLET, FILM COATED ORAL at 02:35

## 2024-02-27 RX ADMIN — Medication 1000 MILLIGRAM(S): at 05:44

## 2024-02-27 RX ADMIN — Medication 400 MILLIGRAM(S): at 04:44

## 2024-02-27 RX ADMIN — QUETIAPINE FUMARATE 25 MILLIGRAM(S): 200 TABLET, FILM COATED ORAL at 05:27

## 2024-02-27 RX ADMIN — FAMOTIDINE 20 MILLIGRAM(S): 10 INJECTION INTRAVENOUS at 05:27

## 2024-02-27 NOTE — ED CDU PROVIDER DISPOSITION NOTE - PATIENT PORTAL LINK FT
You can access the FollowMyHealth Patient Portal offered by [facility] by registering at the following website: [email or web address]. By joining [employer]’s FollowMyHealth portal, you will also be able to view your health information using other applications (apps) compatible with our system.

## 2024-02-27 NOTE — ED CDU PROVIDER DISPOSITION NOTE - ATTENDING CONTRIBUTION TO CARE
83 year old female w/ PMHx of dementia, AAOX1 - self, CVA S/P MOHAN X 3 months, BIBEMS from home; c/o R knee pain. Placed in obs for further evaluation and DC plannng. Treat for UTI, had MR brain w/o acute findings

## 2024-02-27 NOTE — ED ADULT NURSE REASSESSMENT NOTE - NS ED NURSE REASSESS COMMENT FT1
Pt restless, confused and yelling. Pt removing cardiac monitor. HUGO Cheek at bedside. Additional orders placed-see mar. Safety maintained. Bed locked lowest position. Pt restless, confused and yelling @0200. Pt removing cardiac monitor. HUGO Cheek at bedside. Additional orders placed-see mar. Safety maintained. Bed locked lowest position.

## 2024-02-27 NOTE — ED CDU PROVIDER DISPOSITION NOTE - CLINICAL COURSE
83 year old female w/ PMHx of dementia, AAOX1 - self, CVA S/P MOHAN X 3 months, BIBEMS from home; c/o R knee pain. Per EMS, daughter stated that the patient had fall in home/bath tub 3 weeks ago. after no acute sequale fond on intial evaluation caused by fall pt placed in obs for further evaluation and DC plaing. Pt found to have UTI no other acute findings, Pt to be dc home with PO meds, Pt educated about when to return to the ED if needed. PT verbalizes that he understands all instructions and results. Pt informed that ED is open and available 24/7 365 days a yr, encouraged to return to the ED if they have any change in condition, or feel the need for revaluation.

## 2024-02-27 NOTE — ED CDU PROVIDER DISPOSITION NOTE - NSFOLLOWUPINSTRUCTIONS_ED_ALL_ED_FT
Patient education: Urinary tract infections in adults (The Basics)  Written by the doctors and editors at [facility]  Please read the Disclaimer at the end of this page.    What is the urinary tract?  The urinary tract is the group of organs in the body that handle urine (figure 1). The urinary tract includes the:    ?Kidneys – These are 2 bean-shaped organs that filter the blood to make urine.    ?Bladder – This is a balloon-shaped organ that stores urine.    ?Ureters – These are 2 tubes that carry urine from the kidneys to the bladder.    ?Urethra – This is the tube that carries urine from the bladder to the outside of the body.    What are urinary tract infections?  Urinary tract infections ("UTIs") are infections that affect either the bladder or the kidneys:    ?Bladder infections are more common than kidney infections. They happen when bacteria get into the urethra and travel up into the bladder. The medical term for bladder infection is "cystitis." Most of the time, when people talk about a UTI, they mean a bladder infection.    ?Kidney infections happen when the bacteria travel even higher, up into the kidneys. The medical term for kidney infection is "pyelonephritis." This is more serious than a bladder infection, and can lead to other serious problems if it is not treated properly.    Both bladder and kidney infections are more common in females than males.    The risk of UTIs is also higher in people who have a urinary catheter. A catheter is a thin, flexible tube that drains urine from the bladder. It might be used in people who are in the hospital and cannot urinate the normal way.    What are the symptoms of a bladder infection?  The symptoms include:    ?Pain or a burning feeling when you urinate    ?The need to urinate often    ?The need to urinate suddenly or in a hurry    ?Blood in the urine    What are the symptoms of a kidney infection?  The symptoms of a kidney infection can include the same urinary symptoms that happen with a bladder infection.    In addition, kidney infections can cause:    ?Fever    ?Back pain    ?Nausea or vomiting    How do I find out if I have a UTI?  If you think that you might have a UTI, call your doctor or nurse. Sometimes, they can tell if you have a UTI just by learning about your symptoms.    Your doctor or nurse might do a simple urine test. If they think that you might have a kidney infection or are unsure what is causing your symptoms, they might also do a more involved urine test to check for bacteria.    How are UTIs treated?  Most UTIs are treated with antibiotic pills. These pills work by killing the germs that cause the infection.    ?If you have a bladder infection, you will probably need to take antibiotics for 3 to 7 days.    ?If you have a kidney infection, you will probably need to take antibiotics for longer, maybe for up to 10 days. If you have a kidney infection, it's also possible that you will need to be treated in the hospital.    Your symptoms should begin to improve within a day of starting antibiotics. But you should finish all of the antibiotic pills. Otherwise, the infection might come back.    If needed, you can also take a medicine to numb your bladder. This medicine eases the pain caused by UTIs. It also reduces the need to urinate.    What if I get bladder infections a lot?  First, check with your doctor or nurse to make sure that you are really having bladder infections. The symptoms of bladder infection can be caused by other things. Your doctor or nurse will want to see if those problems might be causing your symptoms.    But if you are really having repeated infections, there are things that you can do to keep from getting more infections. These include:    ?Drinking more fluid – This can help prevent bladder infections.    ?Vaginal estrogen – If you have already been through menopause, your doctor might suggest this. Vaginal estrogen comes in a cream or a flexible ring that you put into your vagina. It can help prevent bladder infections.    Other things that might help:    ?Avoid spermicides (sperm-killing creams or gels) – Spermicide is a form of birth control. It seems to increase the risk of bladder infections in some females, especially when used with a diaphragm. If you use spermicide and get a lot of bladder infections, you might want to try switching to a different form of birth control.    ?Urinate right after sex – Some doctors think this helps, because it helps flush out germs that might get into the bladder during sex. There is no proof it works, but it also cannot hurt.    If you get a lot of bladder infections, and the above methods have not helped, your doctor might give you antibiotics to help prevent infection. But long-term use of antibiotics has downsides, so doctors usually suggest trying other things first.    Can cranberry juice or other products prevent bladder infections?  People often wonder about "natural" products that claim to help prevent bladder infections. These include cranberry juice and other cranberry products, probiotics, vitamin C, and D-mannose. There is not good evidence that these things work. However, there is also no clear evidence that they are harmful. If you have questions about these or other products, talk with your doctor or nurse.

## 2024-02-27 NOTE — ED CDU PROVIDER SUBSEQUENT DAY NOTE - CLINICAL SUMMARY MEDICAL DECISION MAKING FREE TEXT BOX
83 year old female w/ PMHx of dementia, AAOX1 - self, CVA S/P MOHAN X 3 months, BIBEMS from home; c/o R knee pain. Per EMS, daughter stated that the patient had fall in home/bath tub 3 weeks ago. Per phone conversation w/ daughter, daughter states HHA daily 4hrs, pt was ambulatory w/ a walker Friday, was not ambulatory Saturday onward. No one witnessed fall, pt poor historian.   Xrays with no acute fractures. US notes baker's cyst of Left knee only (does not correlate w pain). CT brain with old strokes noted.  - Pt pending MRI of the brain due to the abnormal finding on the CT scan/ family concern  -UA consistent with UTI  continue ceftriaxone daily x 3  - PT eval recommended MOHAN placement. SW pending.

## 2024-02-28 LAB
-  AMOXICILLIN/CLAVULANIC ACID: SIGNIFICANT CHANGE UP
-  AMPICILLIN/SULBACTAM: SIGNIFICANT CHANGE UP
-  AMPICILLIN: SIGNIFICANT CHANGE UP
-  AZTREONAM: SIGNIFICANT CHANGE UP
-  CEFAZOLIN: SIGNIFICANT CHANGE UP
-  CEFEPIME: SIGNIFICANT CHANGE UP
-  CEFOXITIN: SIGNIFICANT CHANGE UP
-  CEFTRIAXONE: SIGNIFICANT CHANGE UP
-  CEFUROXIME: SIGNIFICANT CHANGE UP
-  CIPROFLOXACIN: SIGNIFICANT CHANGE UP
-  ERTAPENEM: SIGNIFICANT CHANGE UP
-  GENTAMICIN: SIGNIFICANT CHANGE UP
-  LEVOFLOXACIN: SIGNIFICANT CHANGE UP
-  MEROPENEM: SIGNIFICANT CHANGE UP
-  NITROFURANTOIN: SIGNIFICANT CHANGE UP
-  PIPERACILLIN/TAZOBACTAM: SIGNIFICANT CHANGE UP
-  TOBRAMYCIN: SIGNIFICANT CHANGE UP
-  TRIMETHOPRIM/SULFAMETHOXAZOLE: SIGNIFICANT CHANGE UP
CULTURE RESULTS: ABNORMAL
METHOD TYPE: SIGNIFICANT CHANGE UP
ORGANISM # SPEC MICROSCOPIC CNT: ABNORMAL
ORGANISM # SPEC MICROSCOPIC CNT: SIGNIFICANT CHANGE UP
SPECIMEN SOURCE: SIGNIFICANT CHANGE UP
